# Patient Record
Sex: MALE | Race: WHITE | NOT HISPANIC OR LATINO
[De-identification: names, ages, dates, MRNs, and addresses within clinical notes are randomized per-mention and may not be internally consistent; named-entity substitution may affect disease eponyms.]

---

## 2020-06-03 ENCOUNTER — APPOINTMENT (OUTPATIENT)
Dept: OPHTHALMOLOGY | Facility: CLINIC | Age: 40
End: 2020-06-03
Payer: COMMERCIAL

## 2020-06-03 ENCOUNTER — NON-APPOINTMENT (OUTPATIENT)
Age: 40
End: 2020-06-03

## 2020-06-03 PROCEDURE — 92004 COMPRE OPH EXAM NEW PT 1/>: CPT

## 2020-06-04 ENCOUNTER — RESULT CHARGE (OUTPATIENT)
Age: 40
End: 2020-06-04

## 2020-06-05 ENCOUNTER — APPOINTMENT (OUTPATIENT)
Dept: INTERNAL MEDICINE | Facility: CLINIC | Age: 40
End: 2020-06-05
Payer: COMMERCIAL

## 2020-06-05 VITALS
OXYGEN SATURATION: 98 % | DIASTOLIC BLOOD PRESSURE: 78 MMHG | SYSTOLIC BLOOD PRESSURE: 108 MMHG | TEMPERATURE: 97.8 F | WEIGHT: 188 LBS | HEART RATE: 68 BPM

## 2020-06-05 DIAGNOSIS — Z83.3 FAMILY HISTORY OF DIABETES MELLITUS: ICD-10-CM

## 2020-06-05 DIAGNOSIS — J30.2 OTHER SEASONAL ALLERGIC RHINITIS: ICD-10-CM

## 2020-06-05 DIAGNOSIS — Z53.20 PROCEDURE AND TREATMENT NOT CARRIED OUT BECAUSE OF PATIENT'S DECISION FOR UNSPECIFIED REASONS: ICD-10-CM

## 2020-06-05 DIAGNOSIS — M54.2 CERVICALGIA: ICD-10-CM

## 2020-06-05 DIAGNOSIS — Z11.59 ENCOUNTER FOR SCREENING FOR OTHER VIRAL DISEASES: ICD-10-CM

## 2020-06-05 DIAGNOSIS — R51 HEADACHE: ICD-10-CM

## 2020-06-05 DIAGNOSIS — R73.09 OTHER ABNORMAL GLUCOSE: ICD-10-CM

## 2020-06-05 PROCEDURE — 99385 PREV VISIT NEW AGE 18-39: CPT | Mod: 25

## 2020-06-05 PROCEDURE — 36415 COLL VENOUS BLD VENIPUNCTURE: CPT

## 2020-06-05 PROCEDURE — 93000 ELECTROCARDIOGRAM COMPLETE: CPT

## 2020-06-05 RX ORDER — KETOCONAZOLE 20 MG/G
2 CREAM TOPICAL TWICE DAILY
Qty: 1 | Refills: 0 | Status: ACTIVE | COMMUNITY
Start: 2020-06-05 | End: 1900-01-01

## 2020-06-05 RX ORDER — NICOTINE POLACRILEX 2 MG/1
2 GUM, CHEWING ORAL
Qty: 1 | Refills: 2 | Status: ACTIVE | COMMUNITY
Start: 2020-06-05 | End: 1900-01-01

## 2020-06-05 NOTE — PLAN
[FreeTextEntry1] : EKG- NSR 74\par rash- trial of ketoconazole\par smoker- smoking cesation options discussed-\par f/u if symptoms worse or not improving\par

## 2020-06-05 NOTE — HEALTH RISK ASSESSMENT
[de-identified] : was smoking 17 yr.  now down to 3 cig/ day [Audit-CScore] : 3 [Reports changes in hearing] : Reports no changes in hearing [Reports changes in vision] : Reports no changes in vision [de-identified] : saw ophth yest [AdvancecareDate] : 06/20

## 2020-06-05 NOTE — HISTORY OF PRESENT ILLNESS
[de-identified] : vaccine- pt thinks tetanus was 8 yr ago but doesn't want it today\par no testicular pain\par rash- on back, arms- occasionally itchy.  not used any meds for it.  no allev or aggrav factors- started 8 mo ago\par \par occ mild neck pain assoc w mild pulsating sensation over occipital area.\par no vision chg, slurred speech or motor sensory deficitis\par occurs 1-2 x/ mo.  started 4 mo ago\par duration 30 min\par no allev or aggrav factors.  pt denies being stressed.\par  [FreeTextEntry1] : CPE

## 2020-06-08 ENCOUNTER — APPOINTMENT (OUTPATIENT)
Dept: INTERNAL MEDICINE | Facility: CLINIC | Age: 40
End: 2020-06-08
Payer: COMMERCIAL

## 2020-06-08 ENCOUNTER — TRANSCRIPTION ENCOUNTER (OUTPATIENT)
Age: 40
End: 2020-06-08

## 2020-06-08 DIAGNOSIS — R79.89 OTHER SPECIFIED ABNORMAL FINDINGS OF BLOOD CHEMISTRY: ICD-10-CM

## 2020-06-08 LAB
ALBUMIN SERPL ELPH-MCNC: 4.9 G/DL
ALP BLD-CCNC: 41 U/L
ALT SERPL-CCNC: 60 U/L
ANION GAP SERPL CALC-SCNC: 13 MMOL/L
AST SERPL-CCNC: 34 U/L
BASOPHILS # BLD AUTO: 0.06 K/UL
BASOPHILS NFR BLD AUTO: 1 %
BILIRUB SERPL-MCNC: 0.7 MG/DL
BUN SERPL-MCNC: 16 MG/DL
CALCIUM SERPL-MCNC: 9.8 MG/DL
CHLORIDE SERPL-SCNC: 99 MMOL/L
CHOLEST SERPL-MCNC: 192 MG/DL
CHOLEST/HDLC SERPL: 5.9 RATIO
CO2 SERPL-SCNC: 24 MMOL/L
CREAT SERPL-MCNC: 0.87 MG/DL
EOSINOPHIL # BLD AUTO: 0.07 K/UL
EOSINOPHIL NFR BLD AUTO: 1.2 %
ESTIMATED AVERAGE GLUCOSE: 192 MG/DL
GLUCOSE SERPL-MCNC: 202 MG/DL
HAV IGM SER QL: NONREACTIVE
HBA1C MFR BLD HPLC: 8.3 %
HBV CORE IGG+IGM SER QL: NONREACTIVE
HBV SURFACE AB SER QL: NONREACTIVE
HBV SURFACE AG SER QL: NONREACTIVE
HCT VFR BLD CALC: 44.3 %
HCV AB SER QL: NONREACTIVE
HCV S/CO RATIO: 0.1 S/CO
HDLC SERPL-MCNC: 33 MG/DL
HGB BLD-MCNC: 15 G/DL
IMM GRANULOCYTES NFR BLD AUTO: 0.3 %
LDLC SERPL CALC-MCNC: 110 MG/DL
LYMPHOCYTES # BLD AUTO: 2.23 K/UL
LYMPHOCYTES NFR BLD AUTO: 38.3 %
MAN DIFF?: NORMAL
MCHC RBC-ENTMCNC: 29.3 PG
MCHC RBC-ENTMCNC: 33.9 GM/DL
MCV RBC AUTO: 86.5 FL
MONOCYTES # BLD AUTO: 0.51 K/UL
MONOCYTES NFR BLD AUTO: 8.7 %
NEUTROPHILS # BLD AUTO: 2.94 K/UL
NEUTROPHILS NFR BLD AUTO: 50.5 %
PLATELET # BLD AUTO: 321 K/UL
POTASSIUM SERPL-SCNC: 4.5 MMOL/L
PROT SERPL-MCNC: 7.4 G/DL
RBC # BLD: 5.12 M/UL
RBC # FLD: 12.3 %
SAVE SPECIMEN: NORMAL
SODIUM SERPL-SCNC: 136 MMOL/L
T4 FREE SERPL-MCNC: 1.3 NG/DL
TRIGL SERPL-MCNC: 251 MG/DL
TSH SERPL-ACNC: 1.58 UIU/ML
WBC # FLD AUTO: 5.83 K/UL

## 2020-06-08 PROCEDURE — 99214 OFFICE O/P EST MOD 30 MIN: CPT | Mod: 95

## 2020-06-08 RX ORDER — ISOPROPYL ALCOHOL 70 ML/100ML
SWAB TOPICAL
Qty: 100 | Refills: 11 | Status: ACTIVE | COMMUNITY
Start: 2020-06-08 | End: 1900-01-01

## 2020-06-08 RX ORDER — LANCETS 28 GAUGE
EACH MISCELLANEOUS
Qty: 100 | Refills: 11 | Status: ACTIVE | COMMUNITY
Start: 2020-06-08 | End: 1900-01-01

## 2020-06-08 RX ORDER — BLOOD-GLUCOSE METER
W/DEVICE EACH MISCELLANEOUS
Qty: 1 | Refills: 0 | Status: ACTIVE | COMMUNITY
Start: 2020-06-08 | End: 1900-01-01

## 2020-06-08 RX ORDER — BLOOD-GLUCOSE CONTROL, NORMAL
EACH MISCELLANEOUS
Qty: 1 | Refills: 0 | Status: ACTIVE | COMMUNITY
Start: 2020-06-08 | End: 1900-01-01

## 2020-06-08 NOTE — ASSESSMENT
[FreeTextEntry1] : DM- start metformin.  SE discussed. -f/u \par lipids- try diet\par elev LFT-no alcohol-jose r in 1 mo

## 2020-06-08 NOTE — HISTORY OF PRESENT ILLNESS
[Home] : at home, [unfilled] , at the time of the visit. [Medical Office: ([facility])___] : at the medical office located in  [Verbal consent obtained from patient] : the patient, [unfilled] [FreeTextEntry1] : DM [de-identified] : DM- DM- new onset- reviewed DM- disease, complications, diet, treatment and its SE,  \par also reviewed hypoglycemia- prevention, treatment and symptoms\par reviewed glucose monitoring .  pre and post meal DM ranges.\par exercising- running, push ups\par \par lipid-elev -\par elev LFT- no supplements.  pt stopped alcohol\par \par hyperlipid

## 2020-06-08 NOTE — PLAN
[FreeTextEntry1] : 25  minutes minimal was spent with patient and >50% of the time spent in the encounter involved counseling and coordination of care for any and all diagnosis submitted.\par \par

## 2020-07-10 ENCOUNTER — TRANSCRIPTION ENCOUNTER (OUTPATIENT)
Age: 40
End: 2020-07-10

## 2020-07-20 ENCOUNTER — APPOINTMENT (OUTPATIENT)
Dept: ENDOCRINOLOGY | Facility: CLINIC | Age: 40
End: 2020-07-20
Payer: COMMERCIAL

## 2020-07-20 VITALS
OXYGEN SATURATION: 98 % | HEIGHT: 67 IN | DIASTOLIC BLOOD PRESSURE: 68 MMHG | SYSTOLIC BLOOD PRESSURE: 110 MMHG | HEART RATE: 70 BPM | TEMPERATURE: 98.4 F | BODY MASS INDEX: 28.56 KG/M2 | WEIGHT: 182 LBS

## 2020-07-20 PROCEDURE — 99204 OFFICE O/P NEW MOD 45 MIN: CPT

## 2020-07-20 RX ORDER — METFORMIN HYDROCHLORIDE 500 MG/1
500 TABLET, COATED ORAL
Qty: 1 | Refills: 2 | Status: DISCONTINUED | COMMUNITY
Start: 2020-06-08 | End: 2020-07-20

## 2020-07-20 RX ORDER — BLOOD SUGAR DIAGNOSTIC
STRIP MISCELLANEOUS TWICE DAILY
Qty: 2 | Refills: 3 | Status: ACTIVE | COMMUNITY
Start: 2020-06-08 | End: 1900-01-01

## 2020-07-21 NOTE — PHYSICAL EXAM
[Alert] : alert [Well Nourished] : well nourished [Healthy Appearance] : healthy appearance [No Acute Distress] : no acute distress [Normal Sclera/Conjunctiva] : normal sclera/conjunctiva [No Proptosis] : no proptosis [No Neck Mass] : no neck mass was observed [No Respiratory Distress] : no respiratory distress [Thyroid Not Enlarged] : the thyroid was not enlarged [Clear to Auscultation] : lungs were clear to auscultation bilaterally [Normal S1, S2] : normal S1 and S2 [Regular Rhythm] : with a regular rhythm [No Edema] : no peripheral edema [Pedal Pulses Normal] : the pedal pulses are present [Normal Bowel Sounds] : normal bowel sounds [Not Tender] : non-tender [Soft] : abdomen soft [No Spinal Tenderness] : no spinal tenderness [No Involuntary Movements] : no involuntary movements were seen [Normal Strength/Tone] : muscle strength and tone were normal [Right Foot Was Examined] : right foot ~C was examined [Left Foot Was Examined] : left foot ~C was examined [Normal Reflexes] : deep tendon reflexes were 2+ and symmetric [No Tremors] : no tremors [Normal Sensation on Monofilament Testing] : normal sensation on monofilament testing of lower extremities [Normal Affect] : the affect was normal [Normal Mood] : the mood was normal [Foot Ulcers] : no foot ulcers

## 2020-07-21 NOTE — HISTORY OF PRESENT ILLNESS
[FreeTextEntry1] : cc: diabetes\par \par 40 year old man with T2DM.  He recently got medical insurance, has family history of diabetes in his parents, saw PMD for initial visit about 6 weeks ago and was diagnosed with diabetes.  He was started on Metformin about a month ago,  Now taking 500 mg twice daily.  He checks glucose twice daily, values have been 120 - 160 mg/dl and 90 - 110 later in the day.  No hypoglycemia.  Had dilated eye exam, no retinopathy.  He has made dietary changes, has switched to whole grain bread (eats bread and rice every day, Khmer diet).  Does not eat sweets. Eats three meals per day, dinner is largest meal, also with large lunch on Saturday.   Has not seen dietician. Minimal exercise, is active during the day.  Has also gained weight since staying home due to covid.  Has had polydipsia, not polyuria, no numbness/tingling in fingers/toes.  Has had sporadic diarrhea with metformin.\par \par Blood pressure has been controlled\par \par Hyperlipidemia - not on statin, starting with diet

## 2020-07-21 NOTE — REVIEW OF SYSTEMS
[Visual Field Defect] : no visual field defect [Fatigue] : no fatigue [Decreased Appetite] : appetite not decreased [Chest Pain] : no chest pain [Dysphagia] : no dysphagia [Nausea] : no nausea [Shortness Of Breath] : no shortness of breath [Dysuria] : no dysuria [Headaches] : no headaches [Joint Pain] : no joint pain [Acanthosis] : no acanthosis  [Depression] : no depression [As Noted in HPI] : as noted in HPI [Easy Bleeding] : no ~M tendency for easy bleeding

## 2020-07-21 NOTE — ASSESSMENT
[FreeTextEntry1] : 40 year old man with T2DM, recently diagnosed, uncontrolled, with recent improvement based on reported blood glucose values\par  - Counseled pt on long term vascular complications of hyperglycemia as well as prevention and screening.   \par  - Referred to RD/CDE for nutritional counseling\par  - Will switch metformin to extended release and increase metformin to 2000 mg as tolerated\par  - Send glucose log in 2 weeks. \par  - Check urine microalbumin\par  - blood pressure at goal\par  - encouraged pt to continue with smoking cessation\par \par Overweight - Discussed importance of diet and exercise.  If glucose remains uncontrolled, consider adding GLP-1\par \par Hyperlipidemia - repeat lipids after trial of diet and exercise, consider statin\par \par f/u 3 months, send log in 2 weeks

## 2020-07-22 ENCOUNTER — TRANSCRIPTION ENCOUNTER (OUTPATIENT)
Age: 40
End: 2020-07-22

## 2020-07-23 LAB
CREAT SPEC-SCNC: 24 MG/DL
MICROALBUMIN 24H UR DL<=1MG/L-MCNC: <1.2 MG/DL
MICROALBUMIN/CREAT 24H UR-RTO: NORMAL MG/G

## 2020-07-31 ENCOUNTER — APPOINTMENT (OUTPATIENT)
Dept: DERMATOLOGY | Facility: CLINIC | Age: 40
End: 2020-07-31
Payer: COMMERCIAL

## 2020-07-31 VITALS — WEIGHT: 180 LBS | BODY MASS INDEX: 27.28 KG/M2 | HEIGHT: 68 IN

## 2020-07-31 VITALS — TEMPERATURE: 96 F

## 2020-07-31 DIAGNOSIS — L30.1 DYSHIDROSIS [POMPHOLYX]: ICD-10-CM

## 2020-07-31 DIAGNOSIS — B36.0 PITYRIASIS VERSICOLOR: ICD-10-CM

## 2020-07-31 DIAGNOSIS — B35.6 TINEA CRURIS: ICD-10-CM

## 2020-07-31 DIAGNOSIS — Z87.2 PERSONAL HISTORY OF DISEASES OF THE SKIN AND SUBCUTANEOUS TISSUE: ICD-10-CM

## 2020-07-31 DIAGNOSIS — Z84.0 FAMILY HISTORY OF DISEASES OF THE SKIN AND SUBCUTANEOUS TISSUE: ICD-10-CM

## 2020-07-31 DIAGNOSIS — Z12.83 ENCOUNTER FOR SCREENING FOR MALIGNANT NEOPLASM OF SKIN: ICD-10-CM

## 2020-07-31 PROCEDURE — 99203 OFFICE O/P NEW LOW 30 MIN: CPT | Mod: GC

## 2020-07-31 RX ORDER — TRIAMCINOLONE ACETONIDE 1 MG/G
0.1 OINTMENT TOPICAL
Qty: 1 | Refills: 1 | Status: ACTIVE | COMMUNITY
Start: 2020-07-31 | End: 1900-01-01

## 2020-07-31 RX ORDER — KETOCONAZOLE 20.5 MG/ML
2 SHAMPOO, SUSPENSION TOPICAL
Qty: 3 | Refills: 6 | Status: ACTIVE | COMMUNITY
Start: 2020-07-31 | End: 1900-01-01

## 2020-08-06 ENCOUNTER — APPOINTMENT (OUTPATIENT)
Dept: ENDOCRINOLOGY | Facility: CLINIC | Age: 40
End: 2020-08-06

## 2020-08-28 ENCOUNTER — APPOINTMENT (OUTPATIENT)
Dept: ENDOCRINOLOGY | Facility: CLINIC | Age: 40
End: 2020-08-28
Payer: COMMERCIAL

## 2020-08-28 VITALS — BODY MASS INDEX: 27.28 KG/M2 | HEIGHT: 68 IN | WEIGHT: 180 LBS

## 2020-08-28 PROCEDURE — G0108 DIAB MANAGE TRN  PER INDIV: CPT

## 2020-10-26 ENCOUNTER — APPOINTMENT (OUTPATIENT)
Dept: ENDOCRINOLOGY | Facility: CLINIC | Age: 40
End: 2020-10-26
Payer: COMMERCIAL

## 2020-10-26 VITALS
SYSTOLIC BLOOD PRESSURE: 118 MMHG | OXYGEN SATURATION: 98 % | TEMPERATURE: 98 F | HEART RATE: 78 BPM | DIASTOLIC BLOOD PRESSURE: 78 MMHG | HEIGHT: 68 IN | BODY MASS INDEX: 27.58 KG/M2 | WEIGHT: 182 LBS

## 2020-10-26 DIAGNOSIS — R61 GENERALIZED HYPERHIDROSIS: ICD-10-CM

## 2020-10-26 PROCEDURE — 99072 ADDL SUPL MATRL&STAF TM PHE: CPT | Mod: GC

## 2020-10-26 PROCEDURE — 83036 HEMOGLOBIN GLYCOSYLATED A1C: CPT | Mod: GC,QW

## 2020-10-26 PROCEDURE — 82962 GLUCOSE BLOOD TEST: CPT | Mod: GC

## 2020-10-26 PROCEDURE — 99214 OFFICE O/P EST MOD 30 MIN: CPT | Mod: 25,GC

## 2020-10-27 PROBLEM — R61 SWEATING INCREASE: Status: ACTIVE | Noted: 2020-10-27

## 2020-10-27 LAB
GLUCOSE BLDC GLUCOMTR-MCNC: 111
HBA1C MFR BLD HPLC: 6.1

## 2020-10-27 NOTE — REVIEW OF SYSTEMS
[As Noted in HPI] : as noted in HPI [Fatigue] : no fatigue [Decreased Appetite] : appetite not decreased [Visual Field Defect] : no visual field defect [Blurred Vision] : no blurred vision [Dysphagia] : no dysphagia [Chest Pain] : no chest pain [Palpitations] : no palpitations [Lower Ext Edema] : no lower extremity edema [Shortness Of Breath] : no shortness of breath [Nausea] : no nausea [Constipation] : no constipation [Vomiting] : no vomiting [Diarrhea] : no diarrhea [Dysuria] : no dysuria [Joint Pain] : no joint pain [Acanthosis] : no acanthosis  [Headaches] : no headaches [Depression] : no depression [Easy Bleeding] : no ~M tendency for easy bleeding [FreeTextEntry9] : Left heel pain

## 2020-10-27 NOTE — ASSESSMENT
[FreeTextEntry1] : 40 year old man with T2DM, diagnosed earlier this year.\par \par Controlled DM2\par - 10/26  POCT A1c 6.1% (improved from 6/2020 A1c 8.3%), microalbumin neg\par - Patient reports meeting with CDE and making dietary modifications including decreased carbs, sweets and decrease portion size\par - recommend continue Metformin 1000mg BID \par - recommend continue to check BG twice daily\par - Discussed possibly switching to GLP1 agonist, however patient opted to continue current regimen as he is motivated to wean himself off medication with lifestyle changes. \par - At next visit if A1c remains well controlled, will consider decreasing metformin \par  - blood pressure at goal\par  - declines flu shot\par  - retinopathy screening up to date, no retinpathy\par \par Overweight - Discussed importance of diet and exercise.  \par \par Hyperlipidemia - will repeat lipid profile today (fasting) as patient has made dietary changes\par \par Decreased Libido- will obtain Testosterone, LH and FSH levels. \par \par Increased sweating especially at night since last visit. Will recheck TSH with reflex t4 due to new onset of symptoms. \par \par Patient declined flu shot \par \par RTC in 3 months\par Seen and discussed with Dr Smith \par \par \par Raman Laughlin MD\par Endocrine Fellow \par

## 2020-10-27 NOTE — END OF VISIT
[] : Fellow [FreeTextEntry3] : Pt with T2DM, well controlled.   Conitnue metformin.   Repeat lipids.  CHeck Testosterone, lh, fsh, given new symptoms of decreased libido (explianed to pt that if low, will repeat in the morning).  Check TSH

## 2020-10-27 NOTE — HISTORY OF PRESENT ILLNESS
[FreeTextEntry1] : cc: diabetes\par \par 40 year old man with T2DM.  He recently got medical insurance, has family history of diabetes in his parents, Dx in 5/2020.  He was started on metformin. Reports adherence to Metformin 1000mg BID. Patient initially had some diarrhea and abdominal pain on treatment however symptoms have improved. Currently having loose stool, which is tolerable. \par \par He checks glucose twice daily, AM fasting 120-130 Before lunch or dinner 90- low 100s.  No hypoglycemia. \par Patient report meeting with CDE and since then has changed his diet. Decreased carbs ( specifically bread), decreased sweets and no soda, decreased portion size. 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 \par \par Had dilated eye exam in 6/2020, no retinopathy.  \par Does not report neuropathy. Does report some heel pain. \par Reports resolution of polyuria and polydipsia.\par \par  Now reporting decreased libido and as noted some sweating overnight. \par \par Blood pressure has been controlled\par \par Hyperlipidemia - not on statin, has modified diet. 6/2020 ( obtained before dietary changes)

## 2020-10-27 NOTE — PHYSICAL EXAM
[Alert] : alert [Well Nourished] : well nourished [Healthy Appearance] : healthy appearance [No Acute Distress] : no acute distress [Normal Sclera/Conjunctiva] : normal sclera/conjunctiva [No Neck Mass] : no neck mass was observed [Thyroid Not Enlarged] : the thyroid was not enlarged [No Respiratory Distress] : no respiratory distress [Clear to Auscultation] : lungs were clear to auscultation bilaterally [Normal S1, S2] : normal S1 and S2 [Regular Rhythm] : with a regular rhythm [No Edema] : no peripheral edema [Pedal Pulses Normal] : the pedal pulses are present [Normal Bowel Sounds] : normal bowel sounds [Not Tender] : non-tender [Soft] : abdomen soft [No Spinal Tenderness] : no spinal tenderness [No Involuntary Movements] : no involuntary movements were seen [Normal Strength/Tone] : muscle strength and tone were normal [Right Foot Was Examined] : right foot ~C was examined [Left Foot Was Examined] : left foot ~C was examined [Normal Reflexes] : deep tendon reflexes were 2+ and symmetric [No Tremors] : no tremors [Normal Sensation on Monofilament Testing] : normal sensation on monofilament testing of lower extremities [Normal Affect] : the affect was normal [Normal Mood] : the mood was normal [EOMI] : extra ocular movement intact [Oriented x3] : oriented to person, place, and time [Foot Ulcers] : no foot ulcers

## 2020-10-28 LAB
CHOLEST SERPL-MCNC: 135 MG/DL
FSH SERPL-MCNC: 7.7 IU/L
HDLC SERPL-MCNC: 35 MG/DL
LDLC SERPL CALC-MCNC: 70 MG/DL
LH SERPL-ACNC: 3.8 IU/L
NONHDLC SERPL-MCNC: 100 MG/DL
TRIGL SERPL-MCNC: 150 MG/DL
TSH SERPL-ACNC: 1.72 UIU/ML

## 2020-11-10 LAB
TESTOST BND SERPL-MCNC: 10.5 PG/ML
TESTOST SERPL-MCNC: 177.9 NG/DL

## 2021-02-08 ENCOUNTER — APPOINTMENT (OUTPATIENT)
Dept: ENDOCRINOLOGY | Facility: CLINIC | Age: 41
End: 2021-02-08
Payer: COMMERCIAL

## 2021-02-08 VITALS
OXYGEN SATURATION: 98 % | BODY MASS INDEX: 27.43 KG/M2 | HEART RATE: 72 BPM | HEIGHT: 68 IN | TEMPERATURE: 97.8 F | WEIGHT: 181 LBS | SYSTOLIC BLOOD PRESSURE: 120 MMHG | DIASTOLIC BLOOD PRESSURE: 80 MMHG

## 2021-02-08 DIAGNOSIS — E11.65 TYPE 2 DIABETES MELLITUS WITH HYPERGLYCEMIA: ICD-10-CM

## 2021-02-08 DIAGNOSIS — R68.82 DECREASED LIBIDO: ICD-10-CM

## 2021-02-08 LAB
GLUCOSE BLDC GLUCOMTR-MCNC: 101
HBA1C MFR BLD HPLC: 5.8

## 2021-02-08 PROCEDURE — 99214 OFFICE O/P EST MOD 30 MIN: CPT | Mod: 25

## 2021-02-08 PROCEDURE — 82962 GLUCOSE BLOOD TEST: CPT

## 2021-02-08 PROCEDURE — 83036 HEMOGLOBIN GLYCOSYLATED A1C: CPT | Mod: QW

## 2021-02-08 PROCEDURE — 99072 ADDL SUPL MATRL&STAF TM PHE: CPT

## 2021-02-08 NOTE — HISTORY OF PRESENT ILLNESS
[FreeTextEntry1] : cc: diabetes\par \par 40 year old man with T2DM. well controlled, on metformin 500 in am and 1000 in pm. Has occasional bloating/gas.  He checks glucose occasionally and values have been 85 - 130 mg/dl.    No hypoglycemia. \par He has been limiting carbohydrate intake.  Minimal exercise (walks a little).  Thinks he has gained some weight.\par Had dilated eye exam in 6/2020, no retinopathy.  \par Does not want flu shot\par \par Still with decreased libido, improvement in overnight sweating (thinks it is related to eating chicken/eggs).  Has not yet had morning testosterone checked.  No galactorrhea, breast tenderness\par \par Blood pressure has been controlled\par \par Hyperlipidemia - not on statin, LDL decreased with dietary change

## 2021-02-08 NOTE — ASSESSMENT
[FreeTextEntry1] : 40 year old man with T2DM, well controlled\par - continue Metformin 1000mg BID \par  - blood pressure at goal\par  - declines flu shot\par  - retinopathy screening up to date, no retinopathy\par \par Overweight - Encouraged pt to continue with diet and increase exercise.  \par \par Hyperlipidemia - LDL 70\par \par Decreased Libido- Check AM testosterone, with lh, fsh, prolactin\par \par \par f/u 3 months\par

## 2021-04-13 ENCOUNTER — TRANSCRIPTION ENCOUNTER (OUTPATIENT)
Age: 41
End: 2021-04-13

## 2021-05-10 ENCOUNTER — APPOINTMENT (OUTPATIENT)
Dept: ENDOCRINOLOGY | Facility: CLINIC | Age: 41
End: 2021-05-10

## 2021-06-17 ENCOUNTER — RX RENEWAL (OUTPATIENT)
Age: 41
End: 2021-06-17

## 2021-06-25 ENCOUNTER — APPOINTMENT (OUTPATIENT)
Dept: INTERNAL MEDICINE | Facility: CLINIC | Age: 41
End: 2021-06-25
Payer: COMMERCIAL

## 2021-06-25 ENCOUNTER — LABORATORY RESULT (OUTPATIENT)
Age: 41
End: 2021-06-25

## 2021-06-25 DIAGNOSIS — Z20.828 CONTACT WITH AND (SUSPECTED) EXPOSURE TO OTHER VIRAL COMMUNICABLE DISEASES: ICD-10-CM

## 2021-06-25 PROCEDURE — 99072 ADDL SUPL MATRL&STAF TM PHE: CPT

## 2021-06-25 PROCEDURE — 36415 COLL VENOUS BLD VENIPUNCTURE: CPT

## 2021-06-27 LAB
ALBUMIN SERPL ELPH-MCNC: 4.9 G/DL
ALP BLD-CCNC: 41 U/L
ALT SERPL-CCNC: 49 U/L
ANION GAP SERPL CALC-SCNC: 18 MMOL/L
APPEARANCE: CLEAR
AST SERPL-CCNC: 23 U/L
BASOPHILS # BLD AUTO: 0.03 K/UL
BASOPHILS NFR BLD AUTO: 0.5 %
BILIRUB SERPL-MCNC: 0.3 MG/DL
BILIRUBIN URINE: NEGATIVE
BLOOD URINE: NEGATIVE
BUN SERPL-MCNC: 13 MG/DL
CALCIUM SERPL-MCNC: 9.7 MG/DL
CHLORIDE SERPL-SCNC: 102 MMOL/L
CHOLEST SERPL-MCNC: 152 MG/DL
CO2 SERPL-SCNC: 21 MMOL/L
COLOR: NORMAL
COVID-19 SPIKE DOMAIN ANTIBODY INTERPRETATION: POSITIVE
CREAT SERPL-MCNC: 0.98 MG/DL
EOSINOPHIL # BLD AUTO: 0.07 K/UL
EOSINOPHIL NFR BLD AUTO: 1.1 %
ERYTHROCYTE [SEDIMENTATION RATE] IN BLOOD BY WESTERGREN METHOD: < 2 MM/HR
ESTIMATED AVERAGE GLUCOSE: 137 MG/DL
GLUCOSE QUALITATIVE U: NEGATIVE
GLUCOSE SERPL-MCNC: 126 MG/DL
HBA1C MFR BLD HPLC: 6.4 %
HCT VFR BLD CALC: 44.8 %
HDLC SERPL-MCNC: 34 MG/DL
HGB BLD-MCNC: 14.9 G/DL
IMM GRANULOCYTES NFR BLD AUTO: 0.3 %
KETONES URINE: NEGATIVE
LDLC SERPL CALC-MCNC: 76 MG/DL
LDLC SERPL DIRECT ASSAY-MCNC: 86 MG/DL
LEUKOCYTE ESTERASE URINE: NEGATIVE
LYMPHOCYTES # BLD AUTO: 2.3 K/UL
LYMPHOCYTES NFR BLD AUTO: 36.9 %
MAN DIFF?: NORMAL
MCHC RBC-ENTMCNC: 28.7 PG
MCHC RBC-ENTMCNC: 33.3 GM/DL
MCV RBC AUTO: 86.2 FL
MONOCYTES # BLD AUTO: 0.49 K/UL
MONOCYTES NFR BLD AUTO: 7.9 %
NEUTROPHILS # BLD AUTO: 3.32 K/UL
NEUTROPHILS NFR BLD AUTO: 53.3 %
NITRITE URINE: NEGATIVE
NONHDLC SERPL-MCNC: 118 MG/DL
PH URINE: 6.5
PLATELET # BLD AUTO: 293 K/UL
POTASSIUM SERPL-SCNC: 4.6 MMOL/L
PROT SERPL-MCNC: 7 G/DL
PROTEIN URINE: NEGATIVE
PSA SERPL-MCNC: 0.37 NG/ML
RBC # BLD: 5.2 M/UL
RBC # FLD: 12.4 %
SARS-COV-2 AB SERPL IA-ACNC: >250 U/ML
SAVE SPECIMEN: NORMAL
SODIUM SERPL-SCNC: 141 MMOL/L
SPECIFIC GRAVITY URINE: 1.01
T3 SERPL-MCNC: 118 NG/DL
T3RU NFR SERPL: 1 TBI
T4 FREE SERPL-MCNC: 1.4 NG/DL
TRIGL SERPL-MCNC: 211 MG/DL
TSH SERPL-ACNC: 1.6 UIU/ML
UROBILINOGEN URINE: NORMAL
WBC # FLD AUTO: 6.23 K/UL

## 2021-07-02 ENCOUNTER — APPOINTMENT (OUTPATIENT)
Dept: INTERNAL MEDICINE | Facility: CLINIC | Age: 41
End: 2021-07-02
Payer: COMMERCIAL

## 2021-07-02 ENCOUNTER — NON-APPOINTMENT (OUTPATIENT)
Age: 41
End: 2021-07-02

## 2021-07-02 VITALS
BODY MASS INDEX: 27.89 KG/M2 | HEART RATE: 93 BPM | HEIGHT: 68 IN | TEMPERATURE: 97.4 F | DIASTOLIC BLOOD PRESSURE: 80 MMHG | WEIGHT: 184 LBS | OXYGEN SATURATION: 98 % | SYSTOLIC BLOOD PRESSURE: 130 MMHG

## 2021-07-02 DIAGNOSIS — E78.5 HYPERLIPIDEMIA, UNSPECIFIED: ICD-10-CM

## 2021-07-02 DIAGNOSIS — F52.4 PREMATURE EJACULATION: ICD-10-CM

## 2021-07-02 DIAGNOSIS — R06.00 DYSPNEA, UNSPECIFIED: ICD-10-CM

## 2021-07-02 DIAGNOSIS — E66.3 OVERWEIGHT: ICD-10-CM

## 2021-07-02 PROCEDURE — 93000 ELECTROCARDIOGRAM COMPLETE: CPT

## 2021-07-02 PROCEDURE — 99072 ADDL SUPL MATRL&STAF TM PHE: CPT

## 2021-07-02 PROCEDURE — 99386 PREV VISIT NEW AGE 40-64: CPT | Mod: 25

## 2021-07-02 PROCEDURE — 99396 PREV VISIT EST AGE 40-64: CPT | Mod: 25

## 2021-07-02 PROCEDURE — 99406 BEHAV CHNG SMOKING 3-10 MIN: CPT | Mod: NC

## 2021-07-02 PROCEDURE — 71046 X-RAY EXAM CHEST 2 VIEWS: CPT

## 2021-07-02 NOTE — HEALTH RISK ASSESSMENT
[Excellent] : ~his/her~  mood as  excellent [] : Yes [20 or more] : 20 or more [Yes] : Yes [Monthly or less (1 pt)] : Monthly or less (1 point) [Less than monthly (1 pt)] : Less than monthly (1 point) [No] : In the past 12 months have you used drugs other than those required for medical reasons? No [HIV test declined] : HIV test declined [Hepatitis C test declined] : Hepatitis C test declined [Behavioral] : behavioral [With Family] : lives with family [Employed] : employed [] :  [Sexually Active] : sexually active [Feels Safe at Home] : Feels safe at home [Fully functional (bathing, dressing, toileting, transferring, walking, feeding)] : Fully functional (bathing, dressing, toileting, transferring, walking, feeding) [Fully functional (using the telephone, shopping, preparing meals, housekeeping, doing laundry, using] : Fully functional and needs no help or supervision to perform IADLs (using the telephone, shopping, preparing meals, housekeeping, doing laundry, using transportation, managing medications and managing finances) [Reports normal functional visual acuity (ie: able to read med bottle)] : Reports normal functional visual acuity [Smoke Detector] : smoke detector [Carbon Monoxide Detector] : carbon monoxide detector [Safety elements used in home] : safety elements used in home [Seat Belt] :  uses seat belt [Sunscreen] : uses sunscreen [FreeTextEntry1] : Gen. health [de-identified] : Endocrinology, dermatology [YearQuit] : Current smoker [de-identified] : Very little exercise, more in the past [Change in mental status noted] : No change in mental status noted [Language] : denies difficulty with language [Behavior] : denies difficulty with behavior [Learning/Retaining New Information] : denies difficulty learning/retaining new information [Handling Complex Tasks] : denies difficulty handling complex tasks [Reasoning] : denies difficulty with reasoning [Spatial Ability and Orientation] : denies difficulty with spatial ability and orientation [High Risk Behavior] : no high risk behavior [Reports changes in hearing] : Reports no changes in hearing [Reports changes in vision] : Reports no changes in vision [Reports changes in dental health] : Reports no changes in dental health [Guns at Home] : no guns at home [Travel to Developing Areas] : does not  travel to developing areas [TB Exposure] : is not being exposed to tuberculosis [Caregiver Concerns] : does not have caregiver concerns [de-identified] : Until the past 2 years he has not had any preventative care. 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

## 2021-07-02 NOTE — COUNSELING
[Risk of tobacco use and health benefits of smoking cessation discussed] : Risk of tobacco use and health benefits of smoking cessation discussed [Cessation strategies including cessation program discussed] : Cessation strategies including cessation program discussed [Use of nicotine replacement therapies and other medications discussed] : Use of nicotine replacement therapies and other medications discussed [Tobacco Use Cessation Intermediate Greater Than 3 Minutes Up to 10 Minutes] : Tobacco Use Cessation Intermediate Greater Than 3 Minutes Up to 10 Minutes [Potential consequences of obesity discussed] : Potential consequences of obesity discussed [Benefits of weight loss discussed] : Benefits of weight loss discussed [Target Wt Loss Goal ___] : Weight Loss Goals: Target weight loss goal [unfilled] lbs [Needs reinforcement, provided] : Patient needs reinforcement on understanding of lifestyle changes and steps needed to achieve self management goal; reinforcement was provided [Willing to Quit Smoking] : Not willing to quit smoking [FreeTextEntry2] : The patient is not receptive to discontinuation of cigarette smoking. [FreeTextEntry1] : 10

## 2021-07-02 NOTE — HISTORY OF PRESENT ILLNESS
[FreeTextEntry1] : New patient, comprehensive annual examination [de-identified] : Feels well. Diabetes under good control, followed by endocrine. His only present complaint is that early ejaculation, not premature.

## 2021-07-02 NOTE — REVIEW OF SYSTEMS
[Dyspnea on Exertion] : dyspnea on exertion [Negative] : Neurological [Shortness Of Breath] : no shortness of breath [Wheezing] : no wheezing [Cough] : no cough [FreeTextEntry6] : Attributed by the patient to a sedentary lifestyle, no exercise [FreeTextEntry8] : See present illness

## 2021-07-02 NOTE — DATA REVIEWED
[FreeTextEntry1] : Electrocardiogram reveals a sinus rhythm and is within normal limits.\par Chest x-ray reveals clear lung fields, normal heart size and a normal bony thorax for age-no active disease.\par \par Blood work and urinalysis are reviewed and are satisfactory as is his A1c.

## 2021-07-02 NOTE — ASSESSMENT
[FreeTextEntry1] : diabetes is well-controlled. Cigarette smoking is a major risk factor He is slightly overweight and presently not getting exercise.. Diet control may be a bigger problem going ahead in view of his unwillingness to moderatebread and rice.\par Sertraline prior to intercourse on a one dose basis will be tried to see if it doesn't slow ejaculation.\par cPE in one year.,  With PFTs.\par \par He is strongly urged to receive the covid ! vaccination.

## 2021-07-07 ENCOUNTER — TRANSCRIPTION ENCOUNTER (OUTPATIENT)
Age: 41
End: 2021-07-07

## 2021-08-11 ENCOUNTER — TRANSCRIPTION ENCOUNTER (OUTPATIENT)
Age: 41
End: 2021-08-11

## 2021-08-20 ENCOUNTER — TRANSCRIPTION ENCOUNTER (OUTPATIENT)
Age: 41
End: 2021-08-20

## 2021-12-21 ENCOUNTER — RX RENEWAL (OUTPATIENT)
Age: 41
End: 2021-12-21

## 2021-12-21 RX ORDER — METFORMIN ER 500 MG 500 MG/1
500 TABLET ORAL
Qty: 360 | Refills: 1 | Status: ACTIVE | COMMUNITY
Start: 2020-07-20 | End: 1900-01-01

## 2022-01-31 ENCOUNTER — TRANSCRIPTION ENCOUNTER (OUTPATIENT)
Age: 42
End: 2022-01-31

## 2022-02-01 ENCOUNTER — APPOINTMENT (OUTPATIENT)
Dept: ORTHOPEDIC SURGERY | Facility: CLINIC | Age: 42
End: 2022-02-01
Payer: COMMERCIAL

## 2022-02-01 VITALS
DIASTOLIC BLOOD PRESSURE: 93 MMHG | HEART RATE: 86 BPM | WEIGHT: 180 LBS | BODY MASS INDEX: 27.28 KG/M2 | HEIGHT: 68 IN | SYSTOLIC BLOOD PRESSURE: 149 MMHG

## 2022-02-01 DIAGNOSIS — M25.531 PAIN IN RIGHT WRIST: ICD-10-CM

## 2022-02-01 DIAGNOSIS — S69.91XA UNSPECIFIED INJURY OF RIGHT WRIST, HAND AND FINGER(S), INITIAL ENCOUNTER: ICD-10-CM

## 2022-02-01 DIAGNOSIS — M25.511 PAIN IN RIGHT SHOULDER: ICD-10-CM

## 2022-02-01 PROCEDURE — 99203 OFFICE O/P NEW LOW 30 MIN: CPT

## 2022-02-01 PROCEDURE — 73110 X-RAY EXAM OF WRIST: CPT | Mod: RT

## 2022-02-01 PROCEDURE — 73030 X-RAY EXAM OF SHOULDER: CPT | Mod: RT

## 2022-02-01 NOTE — DISCUSSION/SUMMARY
[de-identified] : The patient had an injury to his right shoulder last week.  This seems to be improving with time.  He has only minimal discomfort today.  In terms of his right wrist he had an unknown fracture 6 weeks ago.  I find no evidence of fracture on today's x-rays.  He has minor discomfort.  I have discussed the pathology and natural history with him.  I recommend he use his arms to tolerance.  He will take Advil for pain.  If still symptomatic in 3 weeks he will be reevaluated.

## 2022-02-01 NOTE — HISTORY OF PRESENT ILLNESS
[de-identified] : 41 year old RHD male  presents with right shoulder pain x 1 week. He describes an insidious onset of pain.  Initially he was unable to move his arm. He is taking Advil and Tylenol and applies ice and Bengay. He reports that the pain is diminished. He complains of discomfort over the anterior aspect of the shoulder. He hears a clicking sound when he moves his arm. \par He also requests evaluation of his right wrist. He fractured his wrist approximately 6 months ago after a fall. He was seen at urgent care where he was treated in an immobilizer. He continues to have pain on the dorsum of the wrist intermittently.

## 2022-02-01 NOTE — PHYSICAL EXAM
[Rad] : radial 2+ and symmetric bilaterally [Normal] : Alert and in no acute distress [Poor Appearance] : well-appearing [Acute Distress] : not in acute distress [Obese] : not obese [de-identified] : The patient has no respiratory distress. Mood and affect are normal. The patient is alert and oriented to person, place and time.\par Examination of the cervical spine demonstrates no tenderness, no deformity and no muscle spasm. Cervical spine rotation is 60° to the right, 60° to the left, 75° of extension and 45° of flexion. Neurologic exam of the upper extremities reveals intact sensation to light touch. Motor function is 5 over 5 in all groups. Deep tendon reflexes are 2+ and equal at the biceps, triceps and brachioradialis.\par Examination of the shoulders demonstrates no swelling or deformity.  There is minimal anterior tenderness of the right shoulder.  The shoulder is stable.  Drop arm test is negative.  Impingement is negative.  Belly press test is negative.  [place] test is negative.  He exhibits elevation of 160 degrees bilaterally, external rotation of 50 degrees bilaterally and internal rotation to the upper lumbar level bilaterally.  The elbows are stable and nontender.  There is no pain with elbow motion.  There is no pain with wrist motion.  There is minimal tenderness on the dorsum of the right wrist.  Tendon function is intact.  The skin is intact.  There is no lymphedema. [de-identified] : AP, transscapular and axillary x-rays of the right shoulder taken today demonstrate no fracture, no dislocation and no bony abnormality.  AP, lateral and oblique x-rays of the right wrist taken today demonstrate no fracture, no dislocation and no bony abnormality.\par

## 2022-03-28 ENCOUNTER — TRANSCRIPTION ENCOUNTER (OUTPATIENT)
Age: 42
End: 2022-03-28

## 2022-06-17 ENCOUNTER — APPOINTMENT (OUTPATIENT)
Dept: INTERNAL MEDICINE | Facility: CLINIC | Age: 42
End: 2022-06-17

## 2022-06-21 ENCOUNTER — RX RENEWAL (OUTPATIENT)
Age: 42
End: 2022-06-21

## 2022-06-24 ENCOUNTER — APPOINTMENT (OUTPATIENT)
Dept: INTERNAL MEDICINE | Facility: CLINIC | Age: 42
End: 2022-06-24

## 2022-09-08 ENCOUNTER — APPOINTMENT (OUTPATIENT)
Dept: OTOLARYNGOLOGY | Facility: CLINIC | Age: 42
End: 2022-09-08

## 2022-09-08 VITALS
BODY MASS INDEX: 22.73 KG/M2 | DIASTOLIC BLOOD PRESSURE: 78 MMHG | HEIGHT: 68 IN | HEART RATE: 101 BPM | SYSTOLIC BLOOD PRESSURE: 118 MMHG | WEIGHT: 150 LBS

## 2022-09-08 DIAGNOSIS — H90.5 UNSPECIFIED SENSORINEURAL HEARING LOSS: ICD-10-CM

## 2022-09-08 PROCEDURE — 92557 COMPREHENSIVE HEARING TEST: CPT

## 2022-09-08 PROCEDURE — 92567 TYMPANOMETRY: CPT

## 2022-09-08 PROCEDURE — 99203 OFFICE O/P NEW LOW 30 MIN: CPT | Mod: 25

## 2022-09-08 PROCEDURE — 92504 EAR MICROSCOPY EXAMINATION: CPT

## 2022-09-08 RX ORDER — SERTRALINE 25 MG/1
25 TABLET, FILM COATED ORAL
Qty: 30 | Refills: 11 | Status: COMPLETED | COMMUNITY
Start: 2021-07-02 | End: 2022-09-08

## 2022-09-09 NOTE — HISTORY OF PRESENT ILLNESS
[de-identified] : 42 year old male presents for evaluation for bilateral pressure behind ear and head "pulsing" for about 6 months.\par Several times a week. last for several minutes goes away by itself\par States unsure if any changes in hearing, but occasionally speak very loud, lives in a busy area in [place] for over 20 years with lots of loud noise exposure. \par History of tension headaches\par Denies head/otologic trauma. Denies otalgia, ear infections.\par  \par

## 2022-09-09 NOTE — DATA REVIEWED
[de-identified] : An audiogram was ordered and performed including pure tones, tympanometry and speech testing for the patients complaint of hearing loss\par I have independently reviewed the patient's audiogram from today and my findings include AU Hearing -8kz. DAVE Tymp A

## 2022-09-09 NOTE — ASSESSMENT
[FreeTextEntry1] : 42Y M present for perceived change in hearing. Audiogram shows  AU Hearing -8kz. AU Tymp A. Symptoms stated by patient clinically sound similar to migraines/headaches as the pain starts from the back of the ear and travels upward to the top of the head. Physical exam of ear is normal\par \par Recommend:\par Head Tension\par -Follow up with neurology for headaches and PCP for possible work up of muscular skeletal tension that maybe contributing to patients symptoms\par \par -Return to clinic as needed or sooner if new/worsen symptoms present\par

## 2022-09-09 NOTE — PHYSICAL EXAM
[Binocular Microscopic Exam] : Binocular microscopic exam was performed [Hearing Loss Right Only] : normal [Hearing Loss Left Only] : normal [Normal] : mucosa is normal [Midline] : trachea located in midline position

## 2023-02-06 ENCOUNTER — NON-APPOINTMENT (OUTPATIENT)
Age: 43
End: 2023-02-06

## 2023-02-14 ENCOUNTER — NON-APPOINTMENT (OUTPATIENT)
Age: 43
End: 2023-02-14

## 2023-02-14 ENCOUNTER — APPOINTMENT (OUTPATIENT)
Dept: INTERNAL MEDICINE | Facility: CLINIC | Age: 43
End: 2023-02-14
Payer: COMMERCIAL

## 2023-02-14 VITALS
HEIGHT: 68 IN | BODY MASS INDEX: 22.43 KG/M2 | OXYGEN SATURATION: 98 % | DIASTOLIC BLOOD PRESSURE: 70 MMHG | HEART RATE: 92 BPM | WEIGHT: 148 LBS | TEMPERATURE: 96.5 F | SYSTOLIC BLOOD PRESSURE: 110 MMHG

## 2023-02-14 DIAGNOSIS — F17.210 NICOTINE DEPENDENCE, CIGARETTES, UNCOMPLICATED: ICD-10-CM

## 2023-02-14 DIAGNOSIS — Z11.3 ENCOUNTER FOR SCREENING FOR INFECTIONS WITH A PREDOMINANTLY SEXUAL MODE OF TRANSMISSION: ICD-10-CM

## 2023-02-14 DIAGNOSIS — R51.9 HEADACHE, UNSPECIFIED: ICD-10-CM

## 2023-02-14 DIAGNOSIS — F32.A DEPRESSION, UNSPECIFIED: ICD-10-CM

## 2023-02-14 DIAGNOSIS — Z00.00 ENCOUNTER FOR GENERAL ADULT MEDICAL EXAMINATION W/OUT ABNORMAL FINDINGS: ICD-10-CM

## 2023-02-14 DIAGNOSIS — E11.9 TYPE 2 DIABETES MELLITUS W/OUT COMPLICATIONS: ICD-10-CM

## 2023-02-14 DIAGNOSIS — R10.9 UNSPECIFIED ABDOMINAL PAIN: ICD-10-CM

## 2023-02-14 DIAGNOSIS — Z71.6 TOBACCO ABUSE COUNSELING: ICD-10-CM

## 2023-02-14 DIAGNOSIS — R61 GENERALIZED HYPERHIDROSIS: ICD-10-CM

## 2023-02-14 DIAGNOSIS — F17.200 NICOTINE DEPENDENCE, UNSPECIFIED, UNCOMPLICATED: ICD-10-CM

## 2023-02-14 PROCEDURE — 99407 BEHAV CHNG SMOKING > 10 MIN: CPT

## 2023-02-14 PROCEDURE — 93000 ELECTROCARDIOGRAM COMPLETE: CPT | Mod: 59

## 2023-02-14 PROCEDURE — 36415 COLL VENOUS BLD VENIPUNCTURE: CPT

## 2023-02-14 PROCEDURE — G0444 DEPRESSION SCREEN ANNUAL: CPT | Mod: 59

## 2023-02-14 PROCEDURE — 99396 PREV VISIT EST AGE 40-64: CPT | Mod: 25

## 2023-02-14 NOTE — HISTORY OF PRESENT ILLNESS
[FreeTextEntry1] : HERE FOR Comprehensive annual examination  [de-identified] : sadness and chest heaviness during winter\par LOST 30 LBS OVER 12 MONTHS WITH DIET AND EXERCISE

## 2023-02-14 NOTE — COUNSELING
[Yes] : Risk of tobacco use and health benefits of smoking cessation discussed: Yes [Cessation strategies including cessation program discussed] : Cessation strategies including cessation program discussed [Use of nicotine replacement therapies and other medications discussed] : Use of nicotine replacement therapies and other medications discussed [Encouraged to pick a quit date and identify support needed to quit] : Encouraged to pick a quit date and identify support needed to quit [Smoking Cessation Program Referral] : Smoking Cessation Program Referral  [No] : Not willing to quit smoking [FreeTextEntry3] : 15

## 2023-02-14 NOTE — HEALTH RISK ASSESSMENT
[2 - 3 times a week (3 pts)] : 2 - 3  times a week (3 points) [1 or 2 (0 pts)] : 1 or 2 (0 points) [Never (0 pts)] : Never (0 points) [Yes] : In the past 12 months have you used drugs other than those required for medical reasons? Yes [0] : 2) Feeling down, depressed, or hopeless: Not at all (0) [Hepatitis C test offered] : Hepatitis C test offered [Fair] :  ~his/her~ mood as fair [No falls in past year] : Patient reported no falls in the past year [1] : 1) Little interest or pleasure doing things for several days (1) [I have developed a follow-up plan documented below in the note.] : I have developed a follow-up plan documented below in the note. [HIV Test offered] : HIV Test offered [None] : None [With Family] : lives with family [Employed] : employed [] :  [# Of Children ___] : has [unfilled] children [Audit-CScore] : 3 [[id number]] : 1 [Reports changes in hearing] : Reports no changes in hearing [Reports changes in vision] : Reports no changes in vision [de-identified] : saw ophth yest

## 2023-02-14 NOTE — ASSESSMENT
[FreeTextEntry1] : sadness and chest heaviness during winter\par -PSCY REF\par -CARDIOLOGY REF\par smoker- smoking cesation options discussed-\par PT STATED HE STOP ALL OTHER Reactional (WEED)  DRUGS \par *Immunizations \par COVID -19 TOTAL 0 VACCINE \par Influenza   declines\par Pneumococcal - N/A \par TDAP - DECLINE\par GARDASIL :   DECLINE\par shingles vaccine N/A\par EKG done for HCM at this office today;  NSR no acute ST-T changes\par Lab ( venipuncture ) done today at this office\par Preventive medicine discussed - including importance of lifestyle modification - \par with incorporation of healthy diet,  recommended Diet low fat diet\par + regular exercise\par con't calcium 1200-1500mg and vit D 3308-4702 IU daily and regular weight bearing exercise\par for OP prevention\par weight loss counseling provided as above \par Depression screening WITH the Patient Health Questionnaire-2 (PHQ-2), THE RESULT WAS NEGATIVE.\par The benefits of adequate calcium intake and routine daily cardiovascular exercise were reviewed with the patient.\par  The patient was informed regarding the benefits of a YEARLY screening FOR SKIN CANCER \par -Recommended following up with dermatology for annual skin surveillance.\par Recommended following up with dental, opthalmology\par  The importance of safer-sex was discussed with the patient. \par DISCUSSED PRECAUTIONS AGAINST COVID19, INCLUDING MASK WEARING, SOCIAL DISTANCING AND HAND WASHING.\par -Follow up in 1 year for CPE / annual exam or PRN.\par All questions and concerns were discussed.

## 2023-02-14 NOTE — PHYSICAL EXAM
[Well Nourished] : well nourished [Well Developed] : well developed [Well-Appearing] : well-appearing [Normal Sclera/Conjunctiva] : normal sclera/conjunctiva [PERRL] : pupils equal round and reactive to light [Normal Outer Ear/Nose] : the outer ears and nose were normal in appearance [Normal Oropharynx] : the oropharynx was normal [Normal TMs] : both tympanic membranes were normal [No Lymphadenopathy] : no lymphadenopathy [Supple] : supple [Thyroid Normal, No Nodules] : the thyroid was normal and there were no nodules present [No Respiratory Distress] : no respiratory distress  [No Accessory Muscle Use] : no accessory muscle use [Clear to Auscultation] : lungs were clear to auscultation bilaterally [Normal Rate] : normal rate  [Regular Rhythm] : with a regular rhythm [Normal S1, S2] : normal S1 and S2 [No Murmur] : no murmur heard [No Edema] : there was no peripheral edema [Soft] : abdomen soft [Non Tender] : non-tender [Non-distended] : non-distended [No Masses] : no abdominal mass palpated [Normal Bowel Sounds] : normal bowel sounds [Grossly Normal Strength/Tone] : grossly normal strength/tone [No Focal Deficits] : no focal deficits [Normal Gait] : normal gait [Normal Affect] : the affect was normal [Normal Insight/Judgement] : insight and judgment were intact

## 2023-02-15 ENCOUNTER — NON-APPOINTMENT (OUTPATIENT)
Age: 43
End: 2023-02-15

## 2023-02-15 LAB
25(OH)D3 SERPL-MCNC: 16.2 NG/ML
ALBUMIN SERPL ELPH-MCNC: 4.9 G/DL
ALP BLD-CCNC: 38 U/L
ALT SERPL-CCNC: 13 U/L
ANION GAP SERPL CALC-SCNC: 13 MMOL/L
APPEARANCE: CLEAR
AST SERPL-CCNC: 15 U/L
BASOPHILS # BLD AUTO: 0.05 K/UL
BASOPHILS NFR BLD AUTO: 0.4 %
BILIRUB SERPL-MCNC: 0.5 MG/DL
BILIRUBIN URINE: NEGATIVE
BLOOD URINE: NEGATIVE
BUN SERPL-MCNC: 12 MG/DL
CALCIUM SERPL-MCNC: 9.8 MG/DL
CHLORIDE SERPL-SCNC: 101 MMOL/L
CHOLEST SERPL-MCNC: 127 MG/DL
CO2 SERPL-SCNC: 26 MMOL/L
COLOR: NORMAL
CREAT SERPL-MCNC: 0.8 MG/DL
CREAT SPEC-SCNC: 78 MG/DL
EGFR: 114 ML/MIN/1.73M2
EOSINOPHIL # BLD AUTO: 0.02 K/UL
EOSINOPHIL NFR BLD AUTO: 0.2 %
ESTIMATED AVERAGE GLUCOSE: 123 MG/DL
GLUCOSE QUALITATIVE U: NEGATIVE
GLUCOSE SERPL-MCNC: 93 MG/DL
HBA1C MFR BLD HPLC: 5.9 %
HBV SURFACE AB SER QL: NONREACTIVE
HBV SURFACE AG SER QL: NONREACTIVE
HCT VFR BLD CALC: 45.8 %
HCV AB SER QL: NONREACTIVE
HCV S/CO RATIO: 0.06 S/CO
HDLC SERPL-MCNC: 37 MG/DL
HGB BLD-MCNC: 15.3 G/DL
HIV1+2 AB SPEC QL IA.RAPID: NONREACTIVE
HSV 1+2 IGG SER IA-IMP: NEGATIVE
HSV 1+2 IGG SER IA-IMP: POSITIVE
HSV1 IGG SER QL: 3.2 INDEX
HSV2 IGG SER QL: 0.07 INDEX
IMM GRANULOCYTES NFR BLD AUTO: 0.5 %
KETONES URINE: NEGATIVE
LDLC SERPL CALC-MCNC: 73 MG/DL
LEUKOCYTE ESTERASE URINE: NEGATIVE
LYMPHOCYTES # BLD AUTO: 1.59 K/UL
LYMPHOCYTES NFR BLD AUTO: 12.3 %
MAN DIFF?: NORMAL
MCHC RBC-ENTMCNC: 29.5 PG
MCHC RBC-ENTMCNC: 33.4 GM/DL
MCV RBC AUTO: 88.4 FL
MICROALBUMIN 24H UR DL<=1MG/L-MCNC: <1.2 MG/DL
MICROALBUMIN/CREAT 24H UR-RTO: NORMAL MG/G
MONOCYTES # BLD AUTO: 0.61 K/UL
MONOCYTES NFR BLD AUTO: 4.7 %
NEUTROPHILS # BLD AUTO: 10.58 K/UL
NEUTROPHILS NFR BLD AUTO: 81.9 %
NITRITE URINE: NEGATIVE
NONHDLC SERPL-MCNC: 89 MG/DL
PH URINE: 7
PLATELET # BLD AUTO: 342 K/UL
POTASSIUM SERPL-SCNC: 4.6 MMOL/L
PROT SERPL-MCNC: 7.4 G/DL
PROTEIN URINE: NEGATIVE
PSA SERPL-MCNC: 0.43 NG/ML
RBC # BLD: 5.18 M/UL
RBC # FLD: 12.8 %
SODIUM SERPL-SCNC: 140 MMOL/L
SPECIFIC GRAVITY URINE: 1.01
T PALLIDUM AB SER QL IA: NEGATIVE
TRIGL SERPL-MCNC: 85 MG/DL
TSH SERPL-ACNC: 1.05 UIU/ML
UROBILINOGEN URINE: NORMAL
VIT B12 SERPL-MCNC: 611 PG/ML
WBC # FLD AUTO: 12.91 K/UL

## 2023-02-21 LAB
HSV1 IGM SER QL: NEGATIVE
HSV2 AB FLD-ACNC: NEGATIVE

## 2023-02-22 ENCOUNTER — APPOINTMENT (OUTPATIENT)
Dept: ENDOCRINOLOGY | Facility: CLINIC | Age: 43
End: 2023-02-22

## 2023-03-02 ENCOUNTER — APPOINTMENT (OUTPATIENT)
Dept: ENDOCRINOLOGY | Facility: CLINIC | Age: 43
End: 2023-03-02

## 2023-05-01 ENCOUNTER — APPOINTMENT (OUTPATIENT)
Dept: ENDOCRINOLOGY | Facility: CLINIC | Age: 43
End: 2023-05-01

## 2023-05-01 ENCOUNTER — NON-APPOINTMENT (OUTPATIENT)
Age: 43
End: 2023-05-01

## 2023-05-02 ENCOUNTER — INPATIENT (INPATIENT)
Facility: HOSPITAL | Age: 43
LOS: 0 days | Discharge: HOME | End: 2023-05-03
Attending: HOSPITALIST | Admitting: HOSPITALIST
Payer: COMMERCIAL

## 2023-05-02 VITALS
DIASTOLIC BLOOD PRESSURE: 85 MMHG | SYSTOLIC BLOOD PRESSURE: 131 MMHG | TEMPERATURE: 98 F | OXYGEN SATURATION: 100 % | HEART RATE: 92 BPM | RESPIRATION RATE: 18 BRPM

## 2023-05-02 DIAGNOSIS — E11.9 TYPE 2 DIABETES MELLITUS WITHOUT COMPLICATIONS: ICD-10-CM

## 2023-05-02 DIAGNOSIS — R07.9 CHEST PAIN, UNSPECIFIED: ICD-10-CM

## 2023-05-02 DIAGNOSIS — F17.200 NICOTINE DEPENDENCE, UNSPECIFIED, UNCOMPLICATED: ICD-10-CM

## 2023-05-02 LAB
ALBUMIN SERPL ELPH-MCNC: 5.1 G/DL — HIGH (ref 3.3–5)
ALP SERPL-CCNC: 43 U/L — SIGNIFICANT CHANGE UP (ref 40–120)
ALT FLD-CCNC: 14 U/L — SIGNIFICANT CHANGE UP (ref 4–41)
ANION GAP SERPL CALC-SCNC: 14 MMOL/L — SIGNIFICANT CHANGE UP (ref 7–14)
APPEARANCE UR: CLEAR — SIGNIFICANT CHANGE UP
APTT BLD: 33.9 SEC — SIGNIFICANT CHANGE UP (ref 27–36.3)
AST SERPL-CCNC: 13 U/L — SIGNIFICANT CHANGE UP (ref 4–40)
BASOPHILS # BLD AUTO: 0.03 K/UL — SIGNIFICANT CHANGE UP (ref 0–0.2)
BASOPHILS NFR BLD AUTO: 0.4 % — SIGNIFICANT CHANGE UP (ref 0–2)
BILIRUB SERPL-MCNC: 0.6 MG/DL — SIGNIFICANT CHANGE UP (ref 0.2–1.2)
BILIRUB UR-MCNC: NEGATIVE — SIGNIFICANT CHANGE UP
BUN SERPL-MCNC: 18 MG/DL — SIGNIFICANT CHANGE UP (ref 7–23)
CALCIUM SERPL-MCNC: 9.8 MG/DL — SIGNIFICANT CHANGE UP (ref 8.4–10.5)
CHLORIDE SERPL-SCNC: 101 MMOL/L — SIGNIFICANT CHANGE UP (ref 98–107)
CK MB BLD-MCNC: 2.4 % — SIGNIFICANT CHANGE UP (ref 0–2.5)
CK MB CFR SERPL CALC: 2.2 NG/ML — SIGNIFICANT CHANGE UP
CK SERPL-CCNC: 91 U/L — SIGNIFICANT CHANGE UP (ref 30–200)
CO2 SERPL-SCNC: 26 MMOL/L — SIGNIFICANT CHANGE UP (ref 22–31)
COLOR SPEC: SIGNIFICANT CHANGE UP
CREAT SERPL-MCNC: 0.84 MG/DL — SIGNIFICANT CHANGE UP (ref 0.5–1.3)
DIFF PNL FLD: NEGATIVE — SIGNIFICANT CHANGE UP
EGFR: 112 ML/MIN/1.73M2 — SIGNIFICANT CHANGE UP
EOSINOPHIL # BLD AUTO: 0.02 K/UL — SIGNIFICANT CHANGE UP (ref 0–0.5)
EOSINOPHIL NFR BLD AUTO: 0.2 % — SIGNIFICANT CHANGE UP (ref 0–6)
GLUCOSE BLDC GLUCOMTR-MCNC: 128 MG/DL — HIGH (ref 70–99)
GLUCOSE SERPL-MCNC: 72 MG/DL — SIGNIFICANT CHANGE UP (ref 70–99)
GLUCOSE UR QL: NEGATIVE — SIGNIFICANT CHANGE UP
HCT VFR BLD CALC: 45.9 % — SIGNIFICANT CHANGE UP (ref 39–50)
HGB BLD-MCNC: 15.6 G/DL — SIGNIFICANT CHANGE UP (ref 13–17)
IANC: 4.98 K/UL — SIGNIFICANT CHANGE UP (ref 1.8–7.4)
IMM GRANULOCYTES NFR BLD AUTO: 0.4 % — SIGNIFICANT CHANGE UP (ref 0–0.9)
INR BLD: 1.09 RATIO — SIGNIFICANT CHANGE UP (ref 0.88–1.16)
KETONES UR-MCNC: ABNORMAL
LEUKOCYTE ESTERASE UR-ACNC: NEGATIVE — SIGNIFICANT CHANGE UP
LIDOCAIN IGE QN: 12 U/L — SIGNIFICANT CHANGE UP (ref 7–60)
LYMPHOCYTES # BLD AUTO: 2.86 K/UL — SIGNIFICANT CHANGE UP (ref 1–3.3)
LYMPHOCYTES # BLD AUTO: 33.8 % — SIGNIFICANT CHANGE UP (ref 13–44)
MAGNESIUM SERPL-MCNC: 2.3 MG/DL — SIGNIFICANT CHANGE UP (ref 1.6–2.6)
MCHC RBC-ENTMCNC: 29.3 PG — SIGNIFICANT CHANGE UP (ref 27–34)
MCHC RBC-ENTMCNC: 34 GM/DL — SIGNIFICANT CHANGE UP (ref 32–36)
MCV RBC AUTO: 86.3 FL — SIGNIFICANT CHANGE UP (ref 80–100)
MONOCYTES # BLD AUTO: 0.53 K/UL — SIGNIFICANT CHANGE UP (ref 0–0.9)
MONOCYTES NFR BLD AUTO: 6.3 % — SIGNIFICANT CHANGE UP (ref 2–14)
NEUTROPHILS # BLD AUTO: 4.98 K/UL — SIGNIFICANT CHANGE UP (ref 1.8–7.4)
NEUTROPHILS NFR BLD AUTO: 58.9 % — SIGNIFICANT CHANGE UP (ref 43–77)
NITRITE UR-MCNC: NEGATIVE — SIGNIFICANT CHANGE UP
NRBC # BLD: 0 /100 WBCS — SIGNIFICANT CHANGE UP (ref 0–0)
NRBC # FLD: 0 K/UL — SIGNIFICANT CHANGE UP (ref 0–0)
NT-PROBNP SERPL-SCNC: 16 PG/ML — SIGNIFICANT CHANGE UP
PH UR: 6.5 — SIGNIFICANT CHANGE UP (ref 5–8)
PLATELET # BLD AUTO: 338 K/UL — SIGNIFICANT CHANGE UP (ref 150–400)
POTASSIUM SERPL-MCNC: 3.5 MMOL/L — SIGNIFICANT CHANGE UP (ref 3.5–5.3)
POTASSIUM SERPL-SCNC: 3.5 MMOL/L — SIGNIFICANT CHANGE UP (ref 3.5–5.3)
PROT SERPL-MCNC: 7.8 G/DL — SIGNIFICANT CHANGE UP (ref 6–8.3)
PROT UR-MCNC: NEGATIVE — SIGNIFICANT CHANGE UP
PROTHROM AB SERPL-ACNC: 12.7 SEC — SIGNIFICANT CHANGE UP (ref 10.5–13.4)
RBC # BLD: 5.32 M/UL — SIGNIFICANT CHANGE UP (ref 4.2–5.8)
RBC # FLD: 12.8 % — SIGNIFICANT CHANGE UP (ref 10.3–14.5)
SODIUM SERPL-SCNC: 141 MMOL/L — SIGNIFICANT CHANGE UP (ref 135–145)
SP GR SPEC: 1.01 — SIGNIFICANT CHANGE UP (ref 1.01–1.05)
TROPONIN T, HIGH SENSITIVITY RESULT: 6 NG/L — SIGNIFICANT CHANGE UP
TROPONIN T, HIGH SENSITIVITY RESULT: 7 NG/L — SIGNIFICANT CHANGE UP
TROPONIN T, HIGH SENSITIVITY RESULT: <6 NG/L — SIGNIFICANT CHANGE UP
UROBILINOGEN FLD QL: SIGNIFICANT CHANGE UP
WBC # BLD: 8.45 K/UL — SIGNIFICANT CHANGE UP (ref 3.8–10.5)
WBC # FLD AUTO: 8.45 K/UL — SIGNIFICANT CHANGE UP (ref 3.8–10.5)

## 2023-05-02 PROCEDURE — 99285 EMERGENCY DEPT VISIT HI MDM: CPT

## 2023-05-02 PROCEDURE — 93010 ELECTROCARDIOGRAM REPORT: CPT

## 2023-05-02 PROCEDURE — 71046 X-RAY EXAM CHEST 2 VIEWS: CPT | Mod: 26

## 2023-05-02 PROCEDURE — 99223 1ST HOSP IP/OBS HIGH 75: CPT

## 2023-05-02 RX ORDER — LANOLIN ALCOHOL/MO/W.PET/CERES
3 CREAM (GRAM) TOPICAL AT BEDTIME
Refills: 0 | Status: DISCONTINUED | OUTPATIENT
Start: 2023-05-02 | End: 2023-05-03

## 2023-05-02 RX ORDER — GLUCAGON INJECTION, SOLUTION 0.5 MG/.1ML
1 INJECTION, SOLUTION SUBCUTANEOUS ONCE
Refills: 0 | Status: DISCONTINUED | OUTPATIENT
Start: 2023-05-02 | End: 2023-05-03

## 2023-05-02 RX ORDER — SODIUM CHLORIDE 9 MG/ML
1000 INJECTION, SOLUTION INTRAVENOUS
Refills: 0 | Status: DISCONTINUED | OUTPATIENT
Start: 2023-05-02 | End: 2023-05-03

## 2023-05-02 RX ORDER — ASPIRIN/CALCIUM CARB/MAGNESIUM 324 MG
81 TABLET ORAL DAILY
Refills: 0 | Status: DISCONTINUED | OUTPATIENT
Start: 2023-05-02 | End: 2023-05-03

## 2023-05-02 RX ORDER — ACETAMINOPHEN 500 MG
650 TABLET ORAL EVERY 6 HOURS
Refills: 0 | Status: DISCONTINUED | OUTPATIENT
Start: 2023-05-02 | End: 2023-05-03

## 2023-05-02 RX ORDER — INSULIN LISPRO 100/ML
VIAL (ML) SUBCUTANEOUS
Refills: 0 | Status: DISCONTINUED | OUTPATIENT
Start: 2023-05-02 | End: 2023-05-03

## 2023-05-02 RX ORDER — HEPARIN SODIUM 5000 [USP'U]/ML
INJECTION INTRAVENOUS; SUBCUTANEOUS
Qty: 25000 | Refills: 0 | Status: DISCONTINUED | OUTPATIENT
Start: 2023-05-02 | End: 2023-05-03

## 2023-05-02 RX ORDER — DEXTROSE 50 % IN WATER 50 %
25 SYRINGE (ML) INTRAVENOUS ONCE
Refills: 0 | Status: DISCONTINUED | OUTPATIENT
Start: 2023-05-02 | End: 2023-05-03

## 2023-05-02 RX ORDER — NITROGLYCERIN 6.5 MG
0.4 CAPSULE, EXTENDED RELEASE ORAL ONCE
Refills: 0 | Status: COMPLETED | OUTPATIENT
Start: 2023-05-02 | End: 2023-05-02

## 2023-05-02 RX ORDER — DEXTROSE 50 % IN WATER 50 %
15 SYRINGE (ML) INTRAVENOUS ONCE
Refills: 0 | Status: DISCONTINUED | OUTPATIENT
Start: 2023-05-02 | End: 2023-05-03

## 2023-05-02 RX ORDER — ONDANSETRON 8 MG/1
4 TABLET, FILM COATED ORAL EVERY 8 HOURS
Refills: 0 | Status: DISCONTINUED | OUTPATIENT
Start: 2023-05-02 | End: 2023-05-03

## 2023-05-02 RX ORDER — INSULIN DEGLUDEC 100 U/ML
15 INJECTION, SOLUTION SUBCUTANEOUS
Refills: 0 | DISCHARGE

## 2023-05-02 RX ORDER — INSULIN LISPRO 100/ML
VIAL (ML) SUBCUTANEOUS AT BEDTIME
Refills: 0 | Status: DISCONTINUED | OUTPATIENT
Start: 2023-05-02 | End: 2023-05-03

## 2023-05-02 RX ORDER — TICAGRELOR 90 MG/1
180 TABLET ORAL ONCE
Refills: 0 | Status: COMPLETED | OUTPATIENT
Start: 2023-05-02 | End: 2023-05-02

## 2023-05-02 RX ORDER — HEPARIN SODIUM 5000 [USP'U]/ML
4100 INJECTION INTRAVENOUS; SUBCUTANEOUS EVERY 6 HOURS
Refills: 0 | Status: DISCONTINUED | OUTPATIENT
Start: 2023-05-02 | End: 2023-05-03

## 2023-05-02 RX ORDER — ATORVASTATIN CALCIUM 80 MG/1
40 TABLET, FILM COATED ORAL AT BEDTIME
Refills: 0 | Status: DISCONTINUED | OUTPATIENT
Start: 2023-05-02 | End: 2023-05-03

## 2023-05-02 RX ORDER — HEPARIN SODIUM 5000 [USP'U]/ML
4100 INJECTION INTRAVENOUS; SUBCUTANEOUS ONCE
Refills: 0 | Status: DISCONTINUED | OUTPATIENT
Start: 2023-05-02 | End: 2023-05-03

## 2023-05-02 RX ORDER — DEXTROSE 50 % IN WATER 50 %
12.5 SYRINGE (ML) INTRAVENOUS ONCE
Refills: 0 | Status: DISCONTINUED | OUTPATIENT
Start: 2023-05-02 | End: 2023-05-03

## 2023-05-02 RX ADMIN — TICAGRELOR 180 MILLIGRAM(S): 90 TABLET ORAL at 21:10

## 2023-05-02 RX ADMIN — Medication 0.4 MILLIGRAM(S): at 21:10

## 2023-05-02 NOTE — H&P ADULT - NSICDXFAMILYHX_GEN_ALL_CORE_FT
FAMILY HISTORY:  Father  Still living? Unknown  FH: type 2 diabetes mellitus, Age at diagnosis: Age Unknown    Mother  Still living? Unknown  FH: type 2 diabetes mellitus, Age at diagnosis: Age Unknown

## 2023-05-02 NOTE — ED ADULT TRIAGE NOTE - CHIEF COMPLAINT QUOTE
c/o left sided c/p that radiates down the left arm, reports pain is progressively  worse  for the past 2 days, with some ADAMS today. hx of DM.

## 2023-05-02 NOTE — H&P ADULT - NSHPREVIEWOFSYSTEMS_GEN_ALL_CORE
REVIEW OF SYSTEMS:    CONSTITUTIONAL: No weakness, fevers or chills  EYES/ENT: No visual changes;  No dysphagia; No sore throat; No rhinorrhea; No sinus pain/pressure  NECK: No pain or stiffness  RESPIRATORY: No cough, wheezing, hemoptysis; No shortness of breath  CARDIOVASCULAR: + chest pain no  palpitations; No lower extremity edema  GASTROINTESTINAL: No abdominal or epigastric pain. No nausea, vomiting, or hematemesis; No diarrhea or constipation. No melena or hematochezia.  GENITOURINARY: No dysuria, frequency or hematuria  NEUROLOGICAL: No numbness or weakness + headache s/p nitroglycerin   MSK: ambulates without assistance   PSYCH: + stress   SKIN: No itching, burning, rashes, or lesions   All other review of systems is negative unless indicated above.

## 2023-05-02 NOTE — CHART NOTE - NSCHARTNOTEFT_GEN_A_CORE
This is 42 year old man, Active Smoker with past medical history with Type 2 Diabetes Mellitus with history of DM, chronic smoker who presents with stuttering garima of chest pain.   EKG is benign without significant ST changes. Patient reports reduction in chest pain but continues to have chest pain rated 4/10 with no radiation to left arm. Troponin 7 and repeat ECG remains without ischemic changes.  Patient has refused heparin gtt.  It was discussed with patient the importance of heparin gtt in treatment of ACS that includes Unstable Angina.  Patient and wife expressed understanding but continue to decline Heparin gtt for now.  Patient has consented to SL NTG.       Impression:  UA vs. NSTEMI  DM (on home insulin)  Chronic smoker    Plan:  - Admit to inpatient telemetry   - S/p asa 4 tabs at urgent care  - Would empirically dose brillinta 180mg x1 now  - Hep bolus and gtt  - Can trial SLN PRN for pain x3. Denies sildenafil use or prescription   - Trend ck, ckmb hstrop x2, EKGs with ongoing chest pain. If ultimately negative, patient would benefit from TTE and further ischemic risk stratification (Coronary CT - which may show nonspecific underlying plaque given his risk factors, vs. treadmill nuc stress)    Zheng Green, ANGEL  [id number]

## 2023-05-02 NOTE — H&P ADULT - PROBLEM SELECTOR PLAN 1
New  - EKG nonischemic and troponins 7-> 6 -> <6, CKMB mass assay negative x2  - no prior LHC or ECHO   - JOSE J score 0, HEART score 1, Sabrina score 40  * trend troponins  * monitor on telemetry  * cardiology consulted: Ticagrelor 180mg x1, heparin drip (pt declining heparin drip at this time), TTE +/- stress test, (stress test ordered so pt can be put on the waiting list pending ECHO)  -Start aspirin 81mg daily and atorvastatin 40mg nightly pending above

## 2023-05-02 NOTE — CONSULT NOTE ADULT - SUBJECTIVE AND OBJECTIVE BOX
Registration Time: 17:57  Initial ECG: _  Saw patient at bedside: 17:57  Called Cath Attending:  Balloon/device time:    Patient seen and evaluated at bedside    Reason for consult: STEMI    HPI: 43yo with history of DM, chronic smoker, who presents with chest pain that worsened over the past 2-3 days. Patient has history of 3 months of chest discomfort, intermittent, not necesssarily worsened with exertion. Over the past 3 days, this pain has become more frequent. It is currently a 5/10 pain, substernal, with now radiation down his left arm. He denies any lightheadedness, dizziness, palpitations, dyspnea (in fact deep breaths provide him with mild relief), nausea or vomiting, diaphoresis.   He does not follow with a cardiologist but he does have a primary care doctor who recently started him on long standing insulin (lantus 18u qHS) for his diabetes. Previously on metformin, no longer taking.  Due to his symptoms, he took 4 baby aspirins, and presented to urgent care. There, they were concerned about EKG findings and called EMS. They offered to bring him to [facility] but patient deferred and instead drove himself to the [facility] ED.     PMHx:   DM on insulin    PSHx: Denies    Home Meds: Lantus 18u qHS, motrin PRN headache otherwise nothing    Allergies:  No Known Allergies    FAMILY HISTORY: Denies significant family cardiac history     Social History: CHronic smoker, with 3-4 cigarettes daily for years. He recreationally drinks during celebratory occasions "but not to get drunk." Remote marijuana use. No heroin, cocaine, IVDU etc   His wife recently gave birth to baby and has adjusting to new family and responsibilities     Review of Systems: 14pt ros neg unless stated above    Physical Exam:  T(F): 98 (05-02), Max: 98 (05-02)  HR: 92 (05-02) (92 - 92)  BP: 131/85 (05-02) (131/85 - 131/85)  RR: 18 (05-02)  SpO2: 100% (05-02)  GENERAL: No acute distress, well-developed  HEAD:  Atraumatic, Normocephalic  ENT: EOMI, PERRLA, conjunctiva and sclera clear, Neck supple, No JVD, moist mucosa  CHEST/LUNG: Clear to auscultation bilaterally; No wheeze, equal breath sounds bilaterally   BACK: No spinal tenderness  HEART: Regular rate and rhythm; No murmurs, rubs, or gallops  ABDOMEN: Soft, Nontender, Nondistended; Bowel sounds present  EXTREMITIES:  No clubbing, cyanosis, or edema  PSYCH: Nl behavior, nl affect  NEUROLOGY: AAOx3, non-focal, cranial nerves intact  SKIN: Normal color, No rashes or lesions    ECG: Urgent care -   sinus rhythm, normal axis, normal intervals, with submmSTE in II, anterior leads that may be seen in repolarization. NO Q waves     Labs: Personally reviewed                 Registration Time: 17:57  Initial EC:15  Saw patient at bedside: 17:57    Patient seen and evaluated at bedside    Reason for consult: UA vs. NSTEMI     HPI: 43yo with history of DM, chronic smoker, who presents with chest pain that worsened over the past 2-3 days. Patient has history of 3 months of chest discomfort, intermittent, not necesssarily worsened with exertion. Over the past 3 days, this pain has become more frequent. It is currently a 5/10 pain, substernal, with now radiation down his left arm. He denies any lightheadedness, dizziness, palpitations, dyspnea (in fact deep breaths provide him with mild relief), nausea or vomiting, diaphoresis.   He does not follow with a cardiologist but he does have a primary care doctor who recently started him on long standing insulin (lantus 18u qHS) for his diabetes. Previously on metformin, no longer taking.  Due to his symptoms, he took 4 baby aspirins, and presented to urgent care. There, they were concerned about EKG findings and called EMS. They offered to bring him to [facility] but patient deferred and instead drove himself to the [facility] ED.     Interval events: Seen in the ED triage area on arrival, where patient was with /85, satting 99% Ra, HR 80. Finger bsg 82    PMHx:   DM on insulin    PSHx: Denies    Home Meds: Lantus 18u qHS, motrin PRN headache otherwise nothing    Allergies:  No Known Allergies    FAMILY HISTORY: Denies significant family cardiac history     Social History: Chronic smoker, with 3-4 cigarettes daily for years. He recreationally drinks during celebratory occasions "but not to get drunk." Remote marijuana use. No heroin, cocaine, IVDU etc   His wife recently gave birth to baby 4 months and has adjusting to new family and responsibilities; he was stressed in the first 2 months but has adjusted and is enjoying fatherhood. 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     Review of Systems: 14pt ros neg unless stated above    Physical Exam:  T(F): 98 (05-02), Max: 98 (05-02)  HR: 92 (05-02) (92 - 92)  BP: 131/85 (05-02) (131/85 - 131/85)  RR: 18 (05-02)  SpO2: 100% (-)  GENERAL: Mild discomfort, clutching his arm  HEAD:  Atraumatic, Normocephalic  ENT: EOMI, PERRLA, conjunctiva and sclera clear, Neck supple, No JVD, moist mucosa  CHEST/LUNG: Clear to auscultation bilaterally; No wheeze, equal breath sounds bilaterally   BACK: No spinal tenderness  HEART: Regular rate and rhythm; No murmurs, rubs, or gallops  ABDOMEN: Soft, Nontender, Nondistended; Bowel sounds present  EXTREMITIES:  No clubbing, cyanosis, or edema  PSYCH: Nl behavior, nl affect  NEUROLOGY: AAOx3, non-focal, cranial nerves intact  SKIN: Normal color, No rashes or lesions    ECG: Urgent care -   sinus rhythm, normal axis, normal intervals, with submmSTE in II, anterior leads that may be seen in repolarization. NO Q waves    ED: sinus rhythm, normal axis, no significant ST deviations. Suggestion of LAE     Labs: Pending                 Registration Time: 17:57  Initial EC:15  Saw patient at bedside: 17:57    Patient seen and evaluated at bedside    Reason for consult: UA vs. NSTEMI     HPI: 43yo with history of DM, chronic smoker, who presents with chest pain that worsened over the past 2-3 days. Patient has history of 3 months of chest discomfort, intermittent, not necesssarily worsened with exertion. Over the past 3 days, this pain has become more frequent. It is currently a 5/10 pain, substernal, with now radiation down his left arm. He denies any lightheadedness, dizziness, palpitations, dyspnea (in fact deep breaths provide him with mild relief), nausea or vomiting, diaphoresis.   He does not follow with a cardiologist but he does have a primary care doctor who recently started him on long standing insulin (lantus 18u qHS) for his diabetes. Previously on metformin, no longer taking.  Due to his symptoms, he took 4 baby aspirins, and presented to urgent care. There, they were concerned about EKG findings and called EMS. They offered to bring him to [facility] but patient deferred and instead drove himself to the [facility] ED.     Interval events: Seen in the ED triage area on arrival, where patient was with /85, satting 99% Ra, HR 80. Finger bsg 82    PMHx:   DM on insulin    PSHx: Denies    Home Meds: Lantus 18u qHS, motrin PRN headache otherwise nothing. Denies sildenafil use     Allergies:  No Known Allergies    FAMILY HISTORY: Denies significant family cardiac history     Social History: Chronic smoker, with 3-4 cigarettes daily for years. He recreationally drinks during celebratory occasions "but not to get drunk." Remote marijuana use. No heroin, cocaine, IVDU etc   His wife recently gave birth to baby 4 months and has adjusting to new family and responsibilities; he was stressed in the first 2 months but has adjusted and is enjoying fatherhood. 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     Review of Systems: 14pt ros neg unless stated above    Physical Exam:  T(F): 98 (05-02), Max: 98 (05-02)  HR: 92 (-02) (92 - 92)  BP: 131/85 (-02) (131/85 - 131/85)  RR: 18 (-)  SpO2: 100% ()  GENERAL: Mild discomfort, clutching his arm  HEAD:  Atraumatic, Normocephalic  ENT: EOMI, PERRLA, conjunctiva and sclera clear, Neck supple, No JVD, moist mucosa  CHEST/LUNG: Clear to auscultation bilaterally; No wheeze, equal breath sounds bilaterally   BACK: No spinal tenderness  HEART: Regular rate and rhythm; No murmurs, rubs, or gallops  ABDOMEN: Soft, Nontender, Nondistended; Bowel sounds present  EXTREMITIES:  No clubbing, cyanosis, or edema  PSYCH: Nl behavior, nl affect  NEUROLOGY: AAOx3, non-focal, cranial nerves intact  SKIN: Normal color, No rashes or lesions    ECG: Urgent care -   sinus rhythm, normal axis, normal intervals, with submmSTE in II, anterior leads that may be seen in repolarization. NO Q waves    ED: sinus rhythm, normal axis, no significant ST deviations. Suggestion of LAE     Labs: Pending                 Registration Time: 17:57  Initial EC:15  Saw patient at bedside: 17:57    Patient seen and evaluated at bedside    Reason for consult: UA vs. NSTEMI     HPI: 43yo with history of DM, chronic smoker, who presents with chest pain that worsened over the past 2-3 days. Patient has history of 3 months of chest discomfort, intermittent, not necesssarily worsened with exertion. Over the past 3 days, this pain has become more frequent. It is currently a 5/10 pain, substernal, with now radiation down his left arm. He denies any lightheadedness, dizziness, palpitations, dyspnea (in fact deep breaths provide him with mild relief), nausea or vomiting, diaphoresis.   He does not follow with a cardiologist but he does have a primary care doctor who recently started him on long standing insulin (lantus 18u qHS) for his diabetes. Previously on metformin, no longer taking.  Due to his symptoms, he took 4 baby aspirins, and presented to urgent care. There, they were concerned about EKG findings and called EMS. [facility] but patient deferred and instead drove himself to the [facility] ED.     Interval events: Seen in the ED triage area on arrival, where patient was with /85, satting 99% Ra, HR 80. Finger bsg 82    PMHx:   DM on insulin    PSHx: Denies    Home Meds: Lantus 18u qHS, motrin PRN headache otherwise nothing. Denies sildenafil use     Allergies:  No Known Allergies    FAMILY HISTORY: Denies significant family cardiac history     Social History: Chronic smoker, with 3-4 cigarettes daily for years. He recreationally drinks during celebratory occasions "but not to get drunk." Remote marijuana use. No heroin, cocaine, IVDU etc   Works in real estate  His wife recently gave birth to baby 4 months and has adjusting to new family and responsibilities; he was stressed in the first 2 months but has adjusted and is enjoying fatherhood. 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     Review of Systems: 14pt ros neg unless stated above    Physical Exam:  T(F): 98 (05-02), Max: 98 (05-02)  HR: 92 (-) (92 - 92)  BP: 131/85 (-) (131/85 - 131/)  RR: 18 (-)  SpO2: 100% ()  GENERAL: Mild discomfort, clutching his arm  HEAD:  Atraumatic, Normocephalic  ENT: EOMI, PERRLA, conjunctiva and sclera clear, Neck supple, No JVD, moist mucosa  CHEST/LUNG: Clear to auscultation bilaterally; No wheeze, equal breath sounds bilaterally   BACK: No spinal tenderness  HEART: Regular rate and rhythm; No murmurs, rubs, or gallops  ABDOMEN: Soft, Nontender, Nondistended; Bowel sounds present  EXTREMITIES:  No clubbing, cyanosis, or edema  PSYCH: Nl behavior, nl affect  NEUROLOGY: AAOx3, non-focal, cranial nerves intact  SKIN: Normal color, No rashes or lesions    ECG: Urgent care -   sinus rhythm, normal axis, normal intervals, with submmSTE in II, anterior leads that may be seen in repolarization. NO Q waves    ED EKG: sinus rhythm, normal axis, no significant ST deviations. Suggestion of LAE     Labs: Pending

## 2023-05-02 NOTE — H&P ADULT - NSHPPHYSICALEXAM_GEN_ALL_CORE
PHYSICAL EXAM:  GENERAL: NAD, comfortable at bedside   HEAD:  Atraumatic, Normocephalic  EYES: EOMI, PERRL, conjunctiva and sclera clear  NECK: Supple, No JVD  CHEST/LUNG: Clear to auscultation bilaterally; No wheezes, rales or rhonchi  HEART: Regular rate and rhythm; No murmurs, rubs, or gallops, (+)S1, S2  ABDOMEN: Soft, Nontender, Nondistended; Normal Bowel sounds   EXTREMITIES:  2+ Peripheral Pulses, No clubbing, cyanosis, or edema  PSYCH: normal mood and affect  NEUROLOGY: AAOx3, non-focal  SKIN: No rashes or lesions

## 2023-05-02 NOTE — H&P ADULT - PROBLEM SELECTOR PLAN 2
Chronic stable  BG 72  Home regimen: Tresiba 15-18 units nightly  Pt would like to bring in his own medication for basal insulin use  Agreeable to [facility]   Diabetic diet  A1c and lipid panel in AM

## 2023-05-02 NOTE — CONSULT NOTE ADULT - ASSESSMENT
43yo with history of DM, chronic smoker who presents with stuttering garima of chest pain.   EKG is benign without significant ST changes.   Given nature of presentation, will manage as ACS medically while awaiting trended EKG and enzymes    Impression:  UA vs. NSTEMI  DM (on home insulin)  Chronic smoker    Plan:  - S/p asa 4 tabs at urgent care  - Would empirically dose brillinta 180mg x1 now  - Hep bolus and gtt  - Trend hstrop x2, EKG with ongoing chest pain  - Can trial SLN PRN     The following was discussed during shift w/ Dr. Isela Shine 41yo with history of DM, chronic smoker who presents with stuttering garima of chest pain.   EKG is benign without significant ST changes.   Given nature of presentation and risk factors, will manage as ACS medically while awaiting trended EKG and enzymes. At present time, no indication for cath lab activation.     Impression:  UA vs. NSTEMI  DM (on home insulin)  Chronic smoker    Plan:  - Admit to inpatient telemetry   - S/p asa 4 tabs at urgent care  - Would empirically dose brillinta 180mg x1 now  - Hep bolus and gtt  - Can trial SLN PRN for pain x3. Denies sildenafil use or prescription   - Trend hstrop x2, EKGs with ongoing chest pain. If ultimately negative, patient would benefit from TTE and further ischemic risk stratification (Coronary CT - which may show underlying plaque given his risk factors, vs. treadmill nuc stress)    The following was discussed during shift w/ interventional cardiology attending Dr. Isela Shine 41yo with history of DM, chronic smoker who presents with stuttering garima of chest pain.   EKG is benign without significant ST changes.   Given nature of presentation and risk factors, will manage as ACS medically while awaiting trended EKGs and enzymes. At present time, no indication for cath lab activation.     Impression:  UA vs. NSTEMI  DM (on home insulin)  Chronic smoker    Plan:  - Admit to inpatient telemetry   - S/p asa 4 tabs at urgent care  - Would empirically dose brillinta 180mg x1 now  - Hep bolus and gtt  - Can trial SLN PRN for pain x3. Denies sildenafil use or prescription   - Trend hstrop x2, EKGs with ongoing chest pain. If ultimately negative, patient would benefit from TTE and further ischemic risk stratification (Coronary CT - which may show underlying plaque given his risk factors, vs. treadmill nuc stress)    The following was discussed during shift w/ interventional cardiology attending Dr. Isela Shine 41yo with history of DM, chronic smoker who presents with stuttering garima of chest pain.   EKG is benign without significant ST changes.   Given nature of presentation and risk factors, will manage as ACS medically while awaiting trended EKGs and enzymes. At present time, no indication for cath lab activation.     Impression:  UA vs. NSTEMI  DM (on home insulin)  Chronic smoker    Plan:  - Admit to inpatient telemetry   - S/p asa 4 tabs at urgent care  - Would empirically dose brillinta 180mg x1 now  - Hep bolus and gtt  - Can trial SLN PRN for pain x3. Denies sildenafil use or prescription   - Trend hstrop x2, EKGs with ongoing chest pain. If ultimately negative, patient would benefit from TTE and further ischemic risk stratification (Coronary CT - which may show nonspecific underlying plaque given his risk factors, vs. treadmill nuc stress)    The following was discussed during shift w/ interventional cardiology attending Dr. Isela Shine

## 2023-05-02 NOTE — H&P ADULT - NSHPLABSRESULTS_GEN_ALL_CORE
labs personally reviewed and pertinent [facility] documents/labs/diagnostics reviewed                         15.6   8.45  )-----------( 338      ( 02 May 2023 19:10 )             45.9       05-02    141  |  101  |  18  ----------------------------<  72  3.5   |  26  |  0.84    Ca    9.8      02 May 2023 19:10  Mg     2.30     05-    TPro  7.8  /  Alb  5.1<H>  /  TBili  0.6  /  DBili  x   /  AST  13  /  ALT  14  /  AlkPhos  43  05-02      CARDIAC MARKERS ( 02 May 2023 21:20 )  x     / x     / 91 U/L / x     / 2.2 ng/mL  CARDIAC MARKERS ( 02 May 2023 19:10 )  x     / x     / 99 U/L / x     / 2.3 ng/mL        PT/INR - ( 02 May 2023 19:10 )   PT: 12.7 sec;   INR: 1.09 ratio         PTT - ( 02 May 2023 19:10 )  PTT:33.9 sec        Urinalysis Basic - ( 02 May 2023 19:18 )    Color: Light Yellow / Appearance: Clear / S.011 / pH: x  Gluc: x / Ketone: Trace  / Bili: Negative / Urobili: <2 mg/dL   Blood: x / Protein: Negative / Nitrite: Negative   Leuk Esterase: Negative / RBC: x / WBC x   Sq Epi: x / Non Sq Epi: x / Bacteria: x      CXR interpreted by myself: clear lungs   EKG interpreted by myself: nonischemic

## 2023-05-02 NOTE — ED ADULT NURSE NOTE - OBJECTIVE STATEMENT
Pt received into intake. pt reports chest pain x 2 dyas on the left side of the chest. pt states pain radiates down to the left arm. Pt states the pain became worse today. Pulses equal bilaterally. Respirations are even and unlabored. Pt denies SOB, N/V/D, dizziness, headaches, and abdominal pain. Pt has a 20 G to the left AC. pt is normal sinus on the tele monitor. PMH of DM. Pt stated he wanted to wait for his blood results to come back, before taking the Brilinta. Will continue to monitor.

## 2023-05-02 NOTE — H&P ADULT - PROBLEM SELECTOR PLAN 3
Chronic  Smokes 3 cigarettes daily  Declined nicotine patch  Counseling on smoking cessation upon discharge

## 2023-05-02 NOTE — ED PROVIDER NOTE - CLINICAL SUMMARY MEDICAL DECISION MAKING FREE TEXT BOX
43 yo M, everyday smoker, hx DM2, presenting with complaints of constant chest pain radiating down L arm that started when he woke up this morning. Pt states he has had similar pain but without radiation that has been intermittent over the past month. Denies nausea/vomiting or diaphoresis. No known inciting/provoking factors. EKG without STEMI. Troponin negative. Plan for labs, cxr, tele, tba for ACS work up

## 2023-05-02 NOTE — H&P ADULT - HISTORY OF PRESENT ILLNESS
42 yr old male with a pmh of T2DM on insulin and tobacco use who presents with an ~3 month history of intermittent left sided 0-6/10 chest discomfort (that he describes as a squeezing sensation). The discomfort has become constant over the past 2-3 days and today it radiated down his left arm causing a pins and needles sensation in his arm.   He went to urgent care and was told to go to the ED for further evaluation.   Pt has had these symptoms in the past but never this severe/constant   Of note: ~3 months ago patient had symptoms of weight loss and "drenching of sweat at night" per wife. It was around this time that his T2DM was found to be uncontrolled and he was transitioned to insulin with good control. He has since regained some of this weight loss and no longer has the night sweats.   Pt also under increased amount of stress over the past 3-4 months with the arrival of their  child. Though he has adjusted to the , he still seems stressed to family and they are concerned and have sent him to see a therapist last week.   Denies  dizziness,  palpitations, SOB, abdominal pain, joint pain, diarrhea/constipation, urinary symptoms.   Vitals: T 98, HR 86, /85, RR 18 satting 100% RA

## 2023-05-03 ENCOUNTER — TRANSCRIPTION ENCOUNTER (OUTPATIENT)
Age: 43
End: 2023-05-03

## 2023-05-03 VITALS
TEMPERATURE: 98 F | DIASTOLIC BLOOD PRESSURE: 89 MMHG | SYSTOLIC BLOOD PRESSURE: 130 MMHG | OXYGEN SATURATION: 100 % | HEART RATE: 69 BPM | RESPIRATION RATE: 17 BRPM

## 2023-05-03 DIAGNOSIS — I10 ESSENTIAL (PRIMARY) HYPERTENSION: ICD-10-CM

## 2023-05-03 DIAGNOSIS — E78.5 HYPERLIPIDEMIA, UNSPECIFIED: ICD-10-CM

## 2023-05-03 LAB
A1C WITH ESTIMATED AVERAGE GLUCOSE RESULT: 5.5 % — SIGNIFICANT CHANGE UP (ref 4–5.6)
ANION GAP SERPL CALC-SCNC: 14 MMOL/L — SIGNIFICANT CHANGE UP (ref 7–14)
BASOPHILS # BLD AUTO: 0.04 K/UL — SIGNIFICANT CHANGE UP (ref 0–0.2)
BASOPHILS NFR BLD AUTO: 0.4 % — SIGNIFICANT CHANGE UP (ref 0–2)
BUN SERPL-MCNC: 26 MG/DL — HIGH (ref 7–23)
CALCIUM SERPL-MCNC: 9.3 MG/DL — SIGNIFICANT CHANGE UP (ref 8.4–10.5)
CHLORIDE SERPL-SCNC: 103 MMOL/L — SIGNIFICANT CHANGE UP (ref 98–107)
CHOLEST SERPL-MCNC: 142 MG/DL — SIGNIFICANT CHANGE UP
CK MB CFR SERPL CALC: 2.6 NG/ML — SIGNIFICANT CHANGE UP
CO2 SERPL-SCNC: 21 MMOL/L — LOW (ref 22–31)
CREAT SERPL-MCNC: 0.86 MG/DL — SIGNIFICANT CHANGE UP (ref 0.5–1.3)
CRP SERPL-MCNC: <3 MG/L — SIGNIFICANT CHANGE UP
EGFR: 112 ML/MIN/1.73M2 — SIGNIFICANT CHANGE UP
EOSINOPHIL # BLD AUTO: 0.11 K/UL — SIGNIFICANT CHANGE UP (ref 0–0.5)
EOSINOPHIL NFR BLD AUTO: 1.2 % — SIGNIFICANT CHANGE UP (ref 0–6)
ERYTHROCYTE [SEDIMENTATION RATE] IN BLOOD: 4 MM/HR — SIGNIFICANT CHANGE UP (ref 1–15)
ESTIMATED AVERAGE GLUCOSE: 111 — SIGNIFICANT CHANGE UP
GLUCOSE BLDC GLUCOMTR-MCNC: 90 MG/DL — SIGNIFICANT CHANGE UP (ref 70–99)
GLUCOSE SERPL-MCNC: 95 MG/DL — SIGNIFICANT CHANGE UP (ref 70–99)
HCT VFR BLD CALC: 43.4 % — SIGNIFICANT CHANGE UP (ref 39–50)
HDLC SERPL-MCNC: 34 MG/DL — LOW
HGB BLD-MCNC: 14.7 G/DL — SIGNIFICANT CHANGE UP (ref 13–17)
IANC: 5.8 K/UL — SIGNIFICANT CHANGE UP (ref 1.8–7.4)
IMM GRANULOCYTES NFR BLD AUTO: 0.3 % — SIGNIFICANT CHANGE UP (ref 0–0.9)
LIPID PNL WITH DIRECT LDL SERPL: 78 MG/DL — SIGNIFICANT CHANGE UP
LYMPHOCYTES # BLD AUTO: 2.38 K/UL — SIGNIFICANT CHANGE UP (ref 1–3.3)
LYMPHOCYTES # BLD AUTO: 26.4 % — SIGNIFICANT CHANGE UP (ref 13–44)
MCHC RBC-ENTMCNC: 29.2 PG — SIGNIFICANT CHANGE UP (ref 27–34)
MCHC RBC-ENTMCNC: 33.9 GM/DL — SIGNIFICANT CHANGE UP (ref 32–36)
MCV RBC AUTO: 86.3 FL — SIGNIFICANT CHANGE UP (ref 80–100)
MONOCYTES # BLD AUTO: 0.66 K/UL — SIGNIFICANT CHANGE UP (ref 0–0.9)
MONOCYTES NFR BLD AUTO: 7.3 % — SIGNIFICANT CHANGE UP (ref 2–14)
NEUTROPHILS # BLD AUTO: 5.8 K/UL — SIGNIFICANT CHANGE UP (ref 1.8–7.4)
NEUTROPHILS NFR BLD AUTO: 64.4 % — SIGNIFICANT CHANGE UP (ref 43–77)
NON HDL CHOLESTEROL: 108 MG/DL — SIGNIFICANT CHANGE UP
NRBC # BLD: 0 /100 WBCS — SIGNIFICANT CHANGE UP (ref 0–0)
NRBC # FLD: 0 K/UL — SIGNIFICANT CHANGE UP (ref 0–0)
PLATELET # BLD AUTO: 326 K/UL — SIGNIFICANT CHANGE UP (ref 150–400)
POTASSIUM SERPL-MCNC: 3.9 MMOL/L — SIGNIFICANT CHANGE UP (ref 3.5–5.3)
POTASSIUM SERPL-SCNC: 3.9 MMOL/L — SIGNIFICANT CHANGE UP (ref 3.5–5.3)
RBC # BLD: 5.03 M/UL — SIGNIFICANT CHANGE UP (ref 4.2–5.8)
RBC # FLD: 12.9 % — SIGNIFICANT CHANGE UP (ref 10.3–14.5)
SODIUM SERPL-SCNC: 138 MMOL/L — SIGNIFICANT CHANGE UP (ref 135–145)
T4 FREE+ TSH PNL SERPL: 1.66 UIU/ML — SIGNIFICANT CHANGE UP (ref 0.27–4.2)
TRIGL SERPL-MCNC: 149 MG/DL — SIGNIFICANT CHANGE UP
TROPONIN T, HIGH SENSITIVITY RESULT: <6 NG/L — SIGNIFICANT CHANGE UP
WBC # BLD: 9.02 K/UL — SIGNIFICANT CHANGE UP (ref 3.8–10.5)
WBC # FLD AUTO: 9.02 K/UL — SIGNIFICANT CHANGE UP (ref 3.8–10.5)

## 2023-05-03 PROCEDURE — 93010 ELECTROCARDIOGRAM REPORT: CPT

## 2023-05-03 PROCEDURE — 75574 CT ANGIO HRT W/3D IMAGE: CPT | Mod: 26

## 2023-05-03 PROCEDURE — 93306 TTE W/DOPPLER COMPLETE: CPT | Mod: 26

## 2023-05-03 PROCEDURE — 99222 1ST HOSP IP/OBS MODERATE 55: CPT

## 2023-05-03 PROCEDURE — 99221 1ST HOSP IP/OBS SF/LOW 40: CPT | Mod: GC

## 2023-05-03 PROCEDURE — 99233 SBSQ HOSP IP/OBS HIGH 50: CPT

## 2023-05-03 RX ORDER — METOPROLOL TARTRATE 50 MG
2.5 TABLET ORAL ONCE
Refills: 0 | Status: DISCONTINUED | OUTPATIENT
Start: 2023-05-03 | End: 2023-05-03

## 2023-05-03 RX ORDER — METOPROLOL TARTRATE 50 MG
25 TABLET ORAL ONCE
Refills: 0 | Status: COMPLETED | OUTPATIENT
Start: 2023-05-03 | End: 2023-05-03

## 2023-05-03 RX ORDER — NICOTINE POLACRILEX 2 MG
1 GUM BUCCAL ONCE
Refills: 0 | Status: DISCONTINUED | OUTPATIENT
Start: 2023-05-03 | End: 2023-05-03

## 2023-05-03 RX ADMIN — SODIUM CHLORIDE 100 MILLILITER(S): 9 INJECTION, SOLUTION INTRAVENOUS at 00:38

## 2023-05-03 RX ADMIN — Medication 81 MILLIGRAM(S): at 12:13

## 2023-05-03 RX ADMIN — Medication 25 MILLIGRAM(S): at 11:55

## 2023-05-03 NOTE — DISCHARGE NOTE NURSING/CASE MANAGEMENT/SOCIAL WORK - PATIENT PORTAL LINK FT
You can access the FollowMyHealth Patient Portal offered by [facility] by registering at the following website: [email or web address]. By joining [employer]’s FollowMyHealth portal, you will also be able to view your health information using other applications (apps) compatible with our system.

## 2023-05-03 NOTE — SBIRT NOTE ADULT - NSSBIRTUNABLESCR_GEN_A_CORE
SW met patient at bedside for SBIRT assessment. Wife present as well and patient consent to discuss in her presence. Patient and wife both declined to participate in SBIRT. Patient stated they are Orthodox and only drinks once a week during [facility] and declined resources. Patient denied using any drugs./Patient refused

## 2023-05-03 NOTE — CONSULT NOTE ADULT - ATTENDING COMMENTS
The patient was seen and examined with the Cardiology Consultation Teaching Service on the morning of 5/3.   Please see the addendum to my note on that date.    Do not bill.    Josh Youssef MD  Cardiology  [phone]
Patient is a 42-year-old male with history of type 2 diabetes, on insulin therapy, on Tresiba 15 to 18 units at bedtime, no Premeal insulin.  Patient follows up with Dr Pineda and Dr. Smith outpatient.  Patient would like to continue his home Tresiba 15 units daily at bedtime.  This is okay with the endocrinology team once it is verified by the pharmacy.  Per his wife, patient may be discharged later today.  Therefore he may resume his home insulin regimen once he returns home.  For BP management continue to monitor, BP goal <130/80, follow-up with outpatient endocrinologist.  Regarding hyperlipidemia, continue with statin therapy.

## 2023-05-03 NOTE — PROGRESS NOTE ADULT - SUBJECTIVE AND OBJECTIVE BOX
ID: 41 yo M with history of DM, chronic smoker, who presents with chest pain that worsened over the past 2-3 days. Cardiology consulted for assistance to care    Patient seen and examined at bedside.    Overnight Events: Saw pt with wife at bedside. Pt reports he is still having 3-4/10 chest pain this morning that is improved from prior but not resolved. Has not had any SOB palpitations or diaphoresis. Pt still actively smoking 1/4 PPD, encouraged to stop. Multiple troponin checks negative and EKG is reassuring, plan on CTCA to risk stratification     Current Meds:  acetaminophen     Tablet .. 650 milliGRAM(s) Oral every 6 hours PRN  aluminum hydroxide/magnesium hydroxide/simethicone Suspension 30 milliLiter(s) Oral every 4 hours PRN  aspirin  chewable 81 milliGRAM(s) Oral daily  atorvastatin 40 milliGRAM(s) Oral at bedtime  dextrose 5%. 1000 milliLiter(s) IV Continuous <Continuous>  dextrose 5%. 1000 milliLiter(s) IV Continuous <Continuous>  dextrose 50% Injectable 25 Gram(s) IV Push once  dextrose 50% Injectable 12.5 Gram(s) IV Push once  dextrose 50% Injectable 25 Gram(s) IV Push once  dextrose Oral Gel 15 Gram(s) Oral once PRN  glucagon  Injectable 1 milliGRAM(s) IntraMuscular once  insulin lispro (ADMELOG) corrective regimen sliding scale   SubCutaneous three times a day before meals  insulin lispro (ADMELOG) corrective regimen sliding scale   SubCutaneous at bedtime  melatonin 3 milliGRAM(s) Oral at bedtime PRN  nicotine -   7 mG/24Hr(s) Patch 1 Patch Transdermal once  ondansetron Injectable 4 milliGRAM(s) IV Push every 8 hours PRN      Vitals:  T(F): 97.7 (-), Max: 98.4 (-)  HR: 67 (-) (67 - 92)  BP: 123/86 (-) (114/78 - 142/85)  RR: 18 (-03)  SpO2: 100% (-)  I&O's Summary    Physical Exam:  Appearance: No acute distress; well appearing  Eyes: PERRL, EOMI, pink conjunctiva  HEENT: Normal oral mucosa  Cardiovascular: RRR, S1, S2, no murmurs, rubs, or gallops; no edema; no JVD  Respiratory: Clear to auscultation bilaterally  Gastrointestinal: soft, non-tender, non-distended with normal bowel sounds  Musculoskeletal: No clubbing; no joint deformity   Neurologic: Non-focal  Lymphatic: No lymphadenopathy  Psychiatry: AAOx3, mood & affect appropriate  Skin: No rashes, ecchymoses, or cyanosis                          14.7   9.02  )-----------( 326      ( 03 May 2023 05:17 )             43.4     05-03    138  |  103  |  26<H>  ----------------------------<  95  3.9   |  21<L>  |  0.86    Ca    9.3      03 May 2023 05:17  Mg     2.30     05-02    TPro  7.8  /  Alb  5.1<H>  /  TBili  0.6  /  DBili  x   /  AST  13  /  ALT  14  /  AlkPhos  43  05-02    PT/INR - ( 02 May 2023 19:10 )   PT: 12.7 sec;   INR: 1.09 ratio         PTT - ( 02 May 2023 19:10 )  PTT:33.9 sec  CARDIAC MARKERS ( 03 May 2023 05:17 )  <6 ng/L / x     / x     / x     / x     / 2.6 ng/mL  CARDIAC MARKERS ( 02 May 2023 21:20 )  <6 ng/L / x     / x     / 91 U/L / x     / 2.2 ng/mL  CARDIAC MARKERS ( 02 May 2023 20:56 )  6 ng/L / x     / x     / x     / x     / x      CARDIAC MARKERS ( 02 May 2023 19:10 )  7 ng/L / x     / x     / 99 U/L / x     / 2.3 ng/mL    Total Cholesterol: 142  LDL: --  HDL: 34  T  ----------------------------------------------------------------------  DIMENSIONS:  Dimensions:     Normal Values:  LA:     2.9 cm    2.0 - 4.0 cm  Ao:     3.2 cm    2.0 - 3.8 cm  SEPTUM: 0.9 cm    0.6 - 1.2 cm  PWT:    0.8 cm    0.6 - 1.1 cm  LVIDd:  4.3 cm    3.0 - 5.6 cm  LVIDs:  2.8 cm    1.8 - 4.0 cm  Derived Variables:  LVMI: 68 g/m2  RWT: 0.37  Fractional short: 35 %  Ejection Fraction (Modified Stanley Rule): 64 %  ------------------------------------------------------------------------  OBSERVATIONS:  Mitral Valve: Normal mitral valve. Minimal mitral  regurgitation.  Aortic Root: Normal aortic root.  Aortic Valve: Normal trileaflet aortic valve.  Left Atrium: Normal left atrium.  Left Ventricle: Normal left ventricular systolic function.  No segmental wall motion abnormalities. Normal left  ventricular internal dimensions and wall thicknesses.  Normal left ventricular diastolic function.  Right Heart: Normal right atrium. Normal right ventricular  size and function. Normal tricuspid valve. Minimal  tricuspid regurgitation. Normal pulmonic valve.  Pericardium/PleuraNormal pericardium with no pericardial  effusion.  ------------------------------------------------------------------------  CONCLUSIONS:  1. Normal mitral valve. Minimal mitral regurgitation.  2. Normal left ventricular internal dimensions and wall  thicknesses.  3. Normal left ventricular systolic function. No segmental  wall motion abnormalities.  4. Normal left ventricular diastolic function.  5. Normal right ventricular size and function.  ------------------------------------------------------------------------  Confirmed on  5/3/2023 - 09:04:49 by Karlos Isaac M.D.,  [facility], [place]  -----------------------------------------------------------------------  A/P  41 yo M with history of DM, chronic smoker, who presents with chest pain that worsened over the past 2-3 days. Cardiology consulted for assistance to care    #Chest Pain  - Pt with DM and tobacco use p/w atypical CP  - Trop neg x4, CKMB neg  - TSH WNL  - S/p heparin gtt + brilinta   - EKG NSR WNL  - TTE with normal EF, no WMA  Plan:  - Low c/f ACS, can DC brilinta and heparin  - Recommend CTCA to help risk stratify CP   - Stop trending CE  - Maintain on tele     Jim Martinez PGY4  Cardiology Fellow    AMBROSIO Youssef 
Lionel Garrett  Hospitalist  Pager- [phone]    PROGRESS NOTE:     Patient is a 42y old  Male who presents with a chief complaint of Chest pain (03 May 2023 11:16)      SUBJECTIVE / OVERNIGHT EVENTS: Pt seen and examined by me   Chest pressure, 4-6/10, symptoms improved on  after receiving NTG but recurred this AM and is currently having chest pressure  Denies SOB or palpitations  Denies LE swelling     ADDITIONAL REVIEW OF SYSTEMS:    MEDICATIONS  (STANDING):  aspirin  chewable 81 milliGRAM(s) Oral daily  atorvastatin 40 milliGRAM(s) Oral at bedtime  dextrose 5%. 1000 milliLiter(s) (100 mL/Hr) IV Continuous <Continuous>  dextrose 5%. 1000 milliLiter(s) (50 mL/Hr) IV Continuous <Continuous>  dextrose 50% Injectable 25 Gram(s) IV Push once  dextrose 50% Injectable 12.5 Gram(s) IV Push once  dextrose 50% Injectable 25 Gram(s) IV Push once  glucagon  Injectable 1 milliGRAM(s) IntraMuscular once  insulin lispro (ADMELOG) corrective regimen sliding scale   SubCutaneous three times a day before meals  insulin lispro (ADMELOG) corrective regimen sliding scale   SubCutaneous at bedtime  metoprolol tartrate 25 milliGRAM(s) Oral once  nicotine -   7 mG/24Hr(s) Patch 1 Patch Transdermal once    MEDICATIONS  (PRN):  acetaminophen     Tablet .. 650 milliGRAM(s) Oral every 6 hours PRN Temp greater or equal to 38C (100.4F), Mild Pain (1 - 3)  aluminum hydroxide/magnesium hydroxide/simethicone Suspension 30 milliLiter(s) Oral every 4 hours PRN Dyspepsia  dextrose Oral Gel 15 Gram(s) Oral once PRN Blood Glucose LESS THAN 70 milliGRAM(s)/deciliter  melatonin 3 milliGRAM(s) Oral at bedtime PRN Insomnia  ondansetron Injectable 4 milliGRAM(s) IV Push every 8 hours PRN Nausea and/or Vomiting      CAPILLARY BLOOD GLUCOSE      POCT Blood Glucose.: 90 mg/dL (03 May 2023 10:55)  POCT Blood Glucose.: 128 mg/dL (02 May 2023 23:49)  POCT Blood Glucose.: 74 mg/dL (02 May 2023 20:15)  POCT Blood Glucose.: 82 mg/dL (02 May 2023 17:59)    I&O's Summary      PHYSICAL EXAM:  Vital Signs Last 24 Hrs  T(C): 36.5 (03 May 2023 05:00), Max: 36.9 (02 May 2023 23:30)  T(F): 97.7 (03 May 2023 05:00), Max: 98.4 (02 May 2023 23:30)  HR: 67 (03 May 2023 05:00) (67 - 92)  BP: 123/86 (03 May 2023 05:00) (114/78 - 142/85)  BP(mean): --  RR: 18 (03 May 2023 05:00) (18 - 18)  SpO2: 100% (03 May 2023 05:00) (100% - 100%)    Parameters below as of 02 May 2023 17:57  Patient On (Oxygen Delivery Method): room air        CONSTITUTIONAL: NAD, well-developed  RESPIRATORY: Normal respiratory effort; lungs are clear to auscultation bilaterally  CARDIOVASCULAR: Regular rate and rhythm, normal S1 and S2, no murmur/rub/gallop; No lower extremity edema; Peripheral pulses are 2+ bilaterally  ABDOMEN: Nontender to palpation, normoactive bowel sounds, no rebound/guarding; No hepatosplenomegaly  MUSCLOSKELETAL: no clubbing or cyanosis of digits; no joint swelling or tenderness to palpation  PSYCH: A+O to person, place, and time; affect appropriate  NEURO: No focal deficits  SKIN: No rash     LABS:                        14.7   9.02  )-----------( 326      ( 03 May 2023 05:17 )             43.4     05-03    138  |  103  |  26<H>  ----------------------------<  95  3.9   |  21<L>  |  0.86    Ca    9.3      03 May 2023 05:17  Mg     2.30     05-02    TPro  7.8  /  Alb  5.1<H>  /  TBili  0.6  /  DBili  x   /  AST  13  /  ALT  14  /  AlkPhos  43  05-02    PT/INR - ( 02 May 2023 19:10 )   PT: 12.7 sec;   INR: 1.09 ratio         PTT - ( 02 May 2023 19:10 )  PTT:33.9 sec  CARDIAC MARKERS ( 03 May 2023 05:17 )  x     / x     / x     / x     / 2.6 ng/mL  CARDIAC MARKERS ( 02 May 2023 21:20 )  x     / x     / 91 U/L / x     / 2.2 ng/mL  CARDIAC MARKERS ( 02 May 2023 19:10 )  x     / x     / 99 U/L / x     / 2.3 ng/mL      Urinalysis Basic - ( 02 May 2023 19:18 )    Color: Light Yellow / Appearance: Clear / S.011 / pH: x  Gluc: x / Ketone: Trace  / Bili: Negative / Urobili: <2 mg/dL   Blood: x / Protein: Negative / Nitrite: Negative   Leuk Esterase: Negative / RBC: x / WBC x   Sq Epi: x / Non Sq Epi: x / Bacteria: x          RADIOLOGY & ADDITIONAL TESTS:  Results Reviewed:   Imaging Personally Reviewed:  Electrocardiogram Personally Reviewed:    COORDINATION OF CARE:  Care Discussed with Consultants/Other Providers [Y/N]:  Prior or Outpatient Records Reviewed [Y/N]:

## 2023-05-03 NOTE — DISCHARGE NOTE PROVIDER - NSDCMRMEDTOKEN_GEN_ALL_CORE_FT
Tresiba FlexTouch 100 units/mL subcutaneous solution: 15 unit(s) subcutaneous once a day (at bedtime) 15-18 units nightly

## 2023-05-03 NOTE — DISCHARGE NOTE PROVIDER - NSDCCPCAREPLAN_GEN_ALL_CORE_FT
PRINCIPAL DISCHARGE DIAGNOSIS  Diagnosis: Chest pain  Assessment and Plan of Treatment: You were admitted for chest pain which could be from angina. Your cardiac markers were negative and your echo was within normal limits. You were given Ticagrelor 180mg x1,  and was started on Aspirin 81mg daily and atorvastatin 40mg nightly. you were seen by Cardiolgy and was advised a CT Coronaries        SECONDARY DISCHARGE DIAGNOSES  Diagnosis: Type 2 diabetes mellitus treated with insulin  Assessment and Plan of Treatment: You were seen by Endocrinology and was recommended  to continue your home regimen. Please adhere to low carb and low fat  diet. Please  call your  doctor with persistent high or low BG at home.  and follow with your endocrinologist Dr Pineda     PRINCIPAL DISCHARGE DIAGNOSIS  Diagnosis: Chest pain  Assessment and Plan of Treatment: You were admitted for chest pain which could be from angina. Your cardiac markers were negative and your echo was within normal limits. You were given Ticagrelor 180mg x1 you were seen by Cardiolgy and was advised a CT Coronaries which are negative. your chest pain is unlikely cardiac cause. Followup with your primary care provider in 1-2 weeks         SECONDARY DISCHARGE DIAGNOSES  Diagnosis: Type 2 diabetes mellitus treated with insulin  Assessment and Plan of Treatment: You were seen by Endocrinology and was recommended  to continue your home regimen. Please adhere to low carb and low fat  diet. Please  call your  doctor with persistent high or low BG at home.  and follow with your endocrinologist Dr Pineda

## 2023-05-03 NOTE — CONSULT NOTE ADULT - ASSESSMENT
Mr. Judge is a 42 year old male with a PMHx of T2DM, HLD, Tobacco use disorder who presents with chest pain. Endocrinology consulted for management of T2DM.    Poorly controlled T2DM with hyperglycemia  - HbA1c: 5.5  - Home Regimen:   - Endocrinologist:  PLAN  - Hold oral DM agents while inpatient  - Start Lantus  units at bedtime. DO NOT HOLD IF NPO.  - Start Admelog  units TID pre-meal. HOLD IF NPO.  - Use moderate/Use low dose Admelog correction scale pre-meal  - Use moderate/Use low dose Admelog correction scale at bedtime  - Fingerstick BG before meals and bedtime  - Goal -180  - Carbohydrate consistent diet  - RD consult  Discharge plan:  - Discharge medications: ************************  - Patient to call doctor with persistent high or low BG at home.   - Ensure patient has glucometer, test strips and lancets on discharge.  - Recommend routine outpatient ophthalmology, podiatry and endocrinology f/u    HTN  - Home regimen:  PLAN  - Can check urine microalbumin outpatient  - Outpatient goal BP <130/80. Management per primary team.    HLD  - Home regimen:  PLAN  - Continue  - Would likely benefit from a statin if no contraindication  - Can check lipid profile if not done recently    Discussed with primary team.    Carlos Haskins MD, Endocrinology Fellow  Pager [phone] from 9am to 5pm. After hours and on weekends, please call [phone].   Mr. Judge is a 42 year old male with a PMHx of T2DM, HLD, Tobacco use disorder who presents with chest pain. Endocrinology consulted for management of T2DM.    T2DM with hyperglycemia  - HbA1c: 5.5  - Home Regimen: tresiba 15 - 18 units  - Endocrinologist: Dr. Edwin TRIPP  - patient can take home tresiba 15 units daily at bedtime (please order as patients own medication, have it sent to pharmacy for verification and can be administered by RN)   - Use low dose Admelog correction scale pre-meal  - Use low dose Admelog correction scale at bedtime  - Fingerstick BG before meals and bedtime  - Goal -180  - Carbohydrate consistent diet  Discharge plan:  - Discharge medications: home tresiba  - Patient to call doctor with persistent high or low BG at home.   - Ensure patient has glucometer, test strips and lancets on discharge.  - Recommend routine outpatient ophthalmology, podiatry and endocrinology f/u    HTN  - BP inpatient <130/80 recently  PLAN  - Can check urine microalbumin outpatient  - Outpatient goal BP <130/80. Management per primary team.    HLD  - LDL 78  PLAN  - Continue atorvastatin 40mg daily per primary team    Discussed with primary team.    Carlos Haskins MD, Endocrinology Fellow  Pager [phone] from 9am to 5pm. After hours and on weekends, please call [phone].

## 2023-05-03 NOTE — DISCHARGE NOTE PROVIDER - DID THE PATIENT PRESENT WITH OR WAS TREATED FOR MALNUTRITION DURING THIS ADMISSION
[FreeTextEntry1] : Summary the patient is a 65-year-old obese male with hypertension, diabetes, recently diagnosed with obstructive sleep apnea, history of COVID-19 pneumonia who presents today for follow-up.  The patient was recently diagnosed with moderate obstructive sleep apnea and his physical exam is unchanged.  An order for an auto CPAP has been sent to his [employer] company.\par \par I have instructed the patient to use Tylenol for his low back pain and to follow-up in 3 months for compliance report
No

## 2023-05-03 NOTE — PROGRESS NOTE ADULT - PROBLEM SELECTOR PLAN 1
Concern for Unstable Angina  - EKG nonischemic and troponins 7-> 6 -> <6, CKMB mass assay negative x2  - no prior LHC or ECHO   - JOSE J score 0, HEART score 1, Sabrina score 40  -ECHO- Normal left ventricular systolic function. No segmental wall motion abnormalities.  - Monitor on telemetry  - s/p  Ticagrelor 180mg x1, heparin drip (pt declining heparin drip at this time)  - On Aspirin, Lipitor  - [facility] Attending Dr Youssef- plan for CT coronaries

## 2023-05-03 NOTE — DISCHARGE NOTE PROVIDER - HOSPITAL COURSE
42 yr old male with a pmh of T2DM on insulin and tobacco use who presents with an ~3 month history of intermittent left sided 0-6/10 chest discomfort (that he describes as a squeezing sensation). The discomfort has become constant over the past 2-3 days and today it radiated down his left arm causing a pins and needles sensation in his arm.   He went to urgent care and was told to go to the ED for further evaluation. Pt has had these symptoms in the past but never this severe/constant   Of note: ~3 months ago patient had symptoms of weight loss and "drenching of sweat at night" per wife. It was around this time that his T2DM was found to be uncontrolled and he was transitioned to insulin with good control. He has since regained some of this weight loss and no longer has the night sweats.   Pt also under increased amount of stress over the past 3-4 months with the arrival of their  child. Though he has adjusted to the , he still seems stressed to family and they are concerned and have sent him to see a therapist last week. No prior LHC or ECHO   Denies  dizziness,  palpitations, SOB, abdominal pain, joint pain, diarrhea/constipation, urinary symptoms.   Vitals: T 98, HR 86, /85, RR 18 satting 100% RA. ECG demonstrates sinus rhythm without definitive ST-segment deviation suggestive of ischemia or injury  Echocardiography demonstrates normal LV size and function, no significant valve dysfunction  Troponins 7-> 6 -> <6, CKMB mass assay negative x2. JOSE J score 0, HEART score 1, Sabrina score 40. ASCVD Risk Score is 7%,  s/p Ticagrelor 180mg x1, was advised heparin drip but pt declined heparin drip. Was started on Aspirin 81mg daily and atorvastatin 40mg nightly  Pt was seen by cardiology and as per recs, patient's clinical history could be consistent with angina, however, his ECG, cardiac biomarkers, and echocardiography are all very reassuring. The differential diagnosis for chest pain is extensive, but it is worth noting that he has no particular risk factors for pulmonary embolism or aortic dissection. His presentation would also be atypical for pericarditis and his inflammatory markers are normal. Pt a  CT angiography should be able to be performed on him for a definitive non-invasive assessment of his coronary arteries. I would defer treatment for acute coronary syndrome, although aspirin and statin are reasonable to use in a diabetic patient with these symptoms for the time being.     #Type 2 diabetes mellitus on long term Insulin. A1C- 5.5. Home regimen- Takes Tresiba 15-18 units nightly. Pt would like to bring in his own medication for basal insulin use  Was seen by Shala and was deemed ok to use home med Tresiba. Sees  Endocrinologist:      # Tobacco dependence- Smokes 3 cigarettes daily. Declined nicotine patch. Counseled  on smoking cessation       42 yr old male with a pmh of T2DM on insulin and tobacco use who presents with an ~3 month history of intermittent left sided 0-6/10 chest discomfort (that he describes as a squeezing sensation). The discomfort has become constant over the past 2-3 days and today it radiated down his left arm causing a pins and needles sensation in his arm.   He went to urgent care and was told to go to the ED for further evaluation. Pt has had these symptoms in the past but never this severe/constant   Of note: ~3 months ago patient had symptoms of weight loss and "drenching of sweat at night" per wife. It was around this time that his T2DM was found to be uncontrolled and he was transitioned to insulin with good control. He has since regained some of this weight loss and no longer has the night sweats.   Pt also under increased amount of stress over the past 3-4 months with the arrival of their  child. Though he has adjusted to the , he still seems stressed to family and they are concerned and have sent him to see a therapist last week. No prior LHC or ECHO   Denies  dizziness,  palpitations, SOB, abdominal pain, joint pain, diarrhea/constipation, urinary symptoms.   Vitals: T 98, HR 86, /85, RR 18 satting 100% RA. ECG demonstrates sinus rhythm without definitive ST-segment deviation suggestive of ischemia or injury  Echocardiography demonstrates normal LV size and function, no significant valve dysfunction  Troponins 7-> 6 -> <6, CKMB mass assay negative x2. JOSE J score 0, HEART score 1, Sabrina score 40. ASCVD Risk Score is 7%,  s/p Ticagrelor 180mg x1, was advised heparin drip but pt declined heparin drip. Was started on Aspirin 81mg daily and atorvastatin 40mg nightly  Pt was seen by cardiology and as per recs, patient's clinical history could be consistent with angina, however, his ECG, cardiac biomarkers, and echocardiography are all very reassuring. The differential diagnosis for chest pain is extensive, but it is worth noting that he has no particular risk factors for pulmonary embolism or aortic dissection. His presentation would also be atypical for pericarditis and his inflammatory markers are normal. CT coronary are normal. Chest pain unlikely cardiac source     #Type 2 diabetes mellitus on long term Insulin. A1C- 5.5. Home regimen- Takes Tresiba 15-18 units nightly. Pt would like to bring in his own medication for basal insulin use  Was seen by Shala and was deemed ok to use home med Tresiba. Sees  Endocrinologist:      # Tobacco dependence- Smokes 3 cigarettes daily. Declined nicotine patch. Counseled  on smoking cessation

## 2023-05-03 NOTE — CHART NOTE - NSCHARTNOTEFT_GEN_A_CORE
Notified by pharmacy pt cannot use home Treshiba pen given they cannot verified when/how the medication was store prior. Treshiba would need to be order out to arrive tomorrow (vial).   If pt can either take lantus tonight or skip a dose to have Treshiba tomorrow.   Discussed with pt he insisted on Treshiba and said will be caution what he eat tonight.    Attending informed

## 2023-05-03 NOTE — PROGRESS NOTE ADULT - ATTENDING COMMENTS
The patient was seen and examined with the Cardiology Consultation Teaching Service.   Patient's spouse at the bedside.    The patient is a 42-year-old male smoker with diabetes who presented with intermittent chest pain that has occurred over the last several months, and increased in severity and frequency over the last several days. Currently his chest pain is constant. No changes with exertion or position are noted. The patient was decreased after one sublingual nitroglycerin.     No dyspnea, orthopnea or PND  No palpitations or syncope    No limitations with regard to exertional tolerance.    PMH/PSH:  Diabetes on insulin  Smoker    Comfortable-appearing man in no acute distress  Alert and oriented  Afebrile  Vital signs stable  JVP is not elevated  Clear lungs  Normal heart sounds  Extremities are warm and perfused  No peripheral edema     Normal blood counts  Normal coagulation  Mild metabolic acidosis, AG 14    hs-troponin undetectable x4  CK-MB normal  pro-BNP 16    CRP <3  HbA1c 5.5  , , HDL 34, LDL 78, NHDL 108    ECG demonstrates sinus rhythm without definitive ST-segment deviation suggestive of ischemia or injury  Echocardiography demonstrates normal LV size and function, no significant valve dysfunction    Impression and Recommendations:   42-year-old male smoker with diabetes who presented with intermittent chest pain that has occurred over the last several months, and increased in severity and frequency over the last several days.    The patient's clinical history could be consistent with angina, however, his ECG, cardiac biomarkers, and echocardiography are all very reassuring. The differential diagnosis for chest pain is extensive, but it is worth noting that he has no particular risk factors for pulmonary embolism or aortic dissection. His presentation would also be atypical for pericarditis and his inflammatory markers are normal.      Although the patient has diabetes and is a smoker, he is relatively young, and coronary CT angiography should be able to be performed on him for a definitive non-invasive assessment of his coronary arteries. I would defer treatment for acute coronary syndrome, although aspirin and statin are reasonable to use in a diabetic patient with these symptoms for the time being.     ASCVD Risk Score is 7%, which falls in the borderline-intermediate range.     Josh Youssef MD  Cardiology  [phone]

## 2023-05-03 NOTE — CONSULT NOTE ADULT - SUBJECTIVE AND OBJECTIVE BOX
ENDOCRINE INITIAL CONSULT - diabetes    HPI:  42 yr old male with a pmh of T2DM on insulin and tobacco use who presents with an ~3 month history of intermittent left sided 0-6/10 chest discomfort (that he describes as a squeezing sensation). The discomfort has become constant over the past 2-3 days and today it radiated down his left arm causing a pins and needles sensation in his arm.   He went to urgent care and was told to go to the ED for further evaluation.   Pt has had these symptoms in the past but never this severe/constant   Of note: ~3 months ago patient had symptoms of weight loss and "drenching of sweat at night" per wife. It was around this time that his T2DM was found to be uncontrolled and he was transitioned to insulin with good control. He has since regained some of this weight loss and no longer has the night sweats.   Pt also under increased amount of stress over the past 3-4 months with the arrival of their  child. Though he has adjusted to the , he still seems stressed to family and they are concerned and have sent him to see a therapist last week.   Denies  dizziness,  palpitations, SOB, abdominal pain, joint pain, diarrhea/constipation, urinary symptoms.   Vitals: T 98, HR 86, /85, RR 18 satting 100% RA   (02 May 2023 23:33)      ENDOCRINE HISTORY   Diagnosed with DM:   Last HbA1c: 5.5  Endocrinologist:   Home DM Meds:  Adherence:  Microvascular complications: Renal, opthalmologic, neuropathy  Macrovascular complications:  SMBG:  Symptoms:  Hypoglycemia episodes:  BG at home:  Diet at home:  Appetite in hospital:  Exercise:  PMHx:  PSHx:  Family hx:  Social hx:  Insurance:  Resides in:      PAST MEDICAL & SURGICAL HISTORY:  T2DM (type 2 diabetes mellitus)      No significant past surgical history          FAMILY HISTORY:  FH: type 2 diabetes mellitus (Father, Mother)        Social History:  Lives with wife, 4 yr old daughter and 4 month old . (02 May 2023 23:33)      Home Medications:  Tresiba FlexTouch 100 units/mL subcutaneous solution: 15 unit(s) subcutaneous once a day (at bedtime) 15-18 units nightly (02 May 2023 23:33)      MEDICATIONS  (STANDING):  aspirin  chewable 81 milliGRAM(s) Oral daily  atorvastatin 40 milliGRAM(s) Oral at bedtime  dextrose 5%. 1000 milliLiter(s) (100 mL/Hr) IV Continuous <Continuous>  dextrose 5%. 1000 milliLiter(s) (50 mL/Hr) IV Continuous <Continuous>  dextrose 50% Injectable 25 Gram(s) IV Push once  dextrose 50% Injectable 12.5 Gram(s) IV Push once  dextrose 50% Injectable 25 Gram(s) IV Push once  glucagon  Injectable 1 milliGRAM(s) IntraMuscular once  insulin lispro (ADMELOG) corrective regimen sliding scale   SubCutaneous three times a day before meals  insulin lispro (ADMELOG) corrective regimen sliding scale   SubCutaneous at bedtime  nicotine -   7 mG/24Hr(s) Patch 1 Patch Transdermal once    MEDICATIONS  (PRN):  acetaminophen     Tablet .. 650 milliGRAM(s) Oral every 6 hours PRN Temp greater or equal to 38C (100.4F), Mild Pain (1 - 3)  aluminum hydroxide/magnesium hydroxide/simethicone Suspension 30 milliLiter(s) Oral every 4 hours PRN Dyspepsia  dextrose Oral Gel 15 Gram(s) Oral once PRN Blood Glucose LESS THAN 70 milliGRAM(s)/deciliter  melatonin 3 milliGRAM(s) Oral at bedtime PRN Insomnia  ondansetron Injectable 4 milliGRAM(s) IV Push every 8 hours PRN Nausea and/or Vomiting      Allergies    No Known Allergies    Intolerances        REVIEW OF SYSTEMS  Constitutional: No fever  Eyes: No blurry vision  Neuro: No tremors  HEENT: No pain  Cardiovascular: No chest pain, palpitations  Respiratory: No SOB, no cough  GI: No nausea, vomiting, abdominal pain  : No dysuria  Skin: no rash  Psych: no depression  Endocrine: no polyuria, polydipsia  Hem/lymph: no swelling  Osteoporosis: no fractures  ALL OTHER SYSTEMS REVIEWED AND NEGATIVE     UNABLE TO OBTAIN     PHYSICAL EXAM   Vital Signs Last 24 Hrs  T(C): 36.5 (03 May 2023 05:00), Max: 36.9 (02 May 2023 23:30)  T(F): 97.7 (03 May 2023 05:00), Max: 98.4 (02 May 2023 23:30)  HR: 67 (03 May 2023 05:00) (67 - 92)  BP: 123/86 (03 May 2023 05:00) (114/78 - 142/85)  BP(mean): --  RR: 18 (03 May 2023 05:00) (18 - 18)  SpO2: 100% (03 May 2023 05:00) (100% - 100%)    Parameters below as of 02 May 2023 17:57  Patient On (Oxygen Delivery Method): room air      GENERAL: NAD, well-groomed, well-developed  EYES: No proptosis, no lid lag, anicteric  HEENT:  Atraumatic, Normocephalic, moist mucous membranes  THYROID: Normal size, no palpable nodules  RESPIRATORY: Clear to auscultation bilaterally; No rales, rhonchi, wheezing  CARDIOVASCULAR: Regular rate and rhythm; No murmurs; no peripheral edema  GI: Soft, nontender, non distended, normal bowel sounds  SKIN: Dry, intact, No rashes or lesions  MUSCULOSKELETAL: Full range of motion, normal strength  NEURO: sensation intact, extraocular movements intact, no tremor  PSYCH: Alert and oriented x 3, normal affect, normal mood  CUSHING'S SIGNS: no striae    CAPILLARY BLOOD GLUCOSE      POCT Blood Glucose.: 128 mg/dL (02 May 2023 23:49)  POCT Blood Glucose.: 74 mg/dL (02 May 2023 20:15)  POCT Blood Glucose.: 82 mg/dL (02 May 2023 17:59)      A1C with Estimated Average Glucose Result: 5.5 % (23 @ 05:17)                            14.7   9.02  )-----------( 326      ( 03 May 2023 05:17 )             43.4       05-    138  |  103  |  26<H>  ----------------------------<  95  3.9   |  21<L>  |  0.86    Ca    9.3      03 May 2023 05:17  Mg     2.30     -    TPro  7.8  /  Alb  5.1<H>  /  TBili  0.6  /  DBili  x   /  AST  13  /  ALT  14  /  AlkPhos  43            LIPIDS    RADIOLOGY ENDOCRINE INITIAL CONSULT - diabetes    HPI:  42 yr old male with a pmh of T2DM on insulin and tobacco use who presents with an ~3 month history of intermittent left sided 0-6/10 chest discomfort (that he describes as a squeezing sensation). The discomfort has become constant over the past 2-3 days and today it radiated down his left arm causing a pins and needles sensation in his arm.   He went to urgent care and was told to go to the ED for further evaluation.   Pt has had these symptoms in the past but never this severe/constant   Of note: ~3 months ago patient had symptoms of weight loss and "drenching of sweat at night" per wife. It was around this time that his T2DM was found to be uncontrolled and he was transitioned to insulin with good control. He has since regained some of this weight loss and no longer has the night sweats.   Pt also under increased amount of stress over the past 3-4 months with the arrival of their  child. Though he has adjusted to the , he still seems stressed to family and they are concerned and have sent him to see a therapist last week.   Denies  dizziness,  palpitations, SOB, abdominal pain, joint pain, diarrhea/constipation, urinary symptoms.   Vitals: T 98, HR 86, /85, RR 18 satting 100% RA   (02 May 2023 23:33)      ENDOCRINE HISTORY   Diagnosed with DM: 2, 3 years ago  Last HbA1c: 5.5  Endocrinologist: Dr. Pineda  [place] DM Meds: tresiba 15 units, occasionally 18 units if he knows he will eat more  Adherence: endorses compliance  Microvascular complications: denies  Macrovascular complications: denies  SMB times daily, am 110-120, afternoon 78-80, denies <70  Symptoms: denies polyuria, polydipsia, neuropathy  Diet at home:  - Breakfast: bread, eggs, waffle  - skips lunch  - Dinner: rice, chicken, meat  - will occasionally have cookies for snacks  - has cut down a lot on soda, occasionally orange juice, has more coffee and tea  Exercise: denies  PMHx: as above  PSHx: as above  Family hx: mother and father with T2DM  Social hx: as below      PAST MEDICAL & SURGICAL HISTORY:  T2DM (type 2 diabetes mellitus)      No significant past surgical history          FAMILY HISTORY:  FH: type 2 diabetes mellitus (Father, Mother)        Social History:  Lives with wife, 4 yr old daughter and 4 month old . (02 May 2023 23:33)      Home Medications:  Tresiba FlexTouch 100 units/mL subcutaneous solution: 15 unit(s) subcutaneous once a day (at bedtime) 15-18 units nightly (02 May 2023 23:33)      MEDICATIONS  (STANDING):  aspirin  chewable 81 milliGRAM(s) Oral daily  atorvastatin 40 milliGRAM(s) Oral at bedtime  dextrose 5%. 1000 milliLiter(s) (100 mL/Hr) IV Continuous <Continuous>  dextrose 5%. 1000 milliLiter(s) (50 mL/Hr) IV Continuous <Continuous>  dextrose 50% Injectable 25 Gram(s) IV Push once  dextrose 50% Injectable 12.5 Gram(s) IV Push once  dextrose 50% Injectable 25 Gram(s) IV Push once  glucagon  Injectable 1 milliGRAM(s) IntraMuscular once  insulin lispro (ADMELOG) corrective regimen sliding scale   SubCutaneous three times a day before meals  insulin lispro (ADMELOG) corrective regimen sliding scale   SubCutaneous at bedtime  nicotine -   7 mG/24Hr(s) Patch 1 Patch Transdermal once    MEDICATIONS  (PRN):  acetaminophen     Tablet .. 650 milliGRAM(s) Oral every 6 hours PRN Temp greater or equal to 38C (100.4F), Mild Pain (1 - 3)  aluminum hydroxide/magnesium hydroxide/simethicone Suspension 30 milliLiter(s) Oral every 4 hours PRN Dyspepsia  dextrose Oral Gel 15 Gram(s) Oral once PRN Blood Glucose LESS THAN 70 milliGRAM(s)/deciliter  melatonin 3 milliGRAM(s) Oral at bedtime PRN Insomnia  ondansetron Injectable 4 milliGRAM(s) IV Push every 8 hours PRN Nausea and/or Vomiting      Allergies    No Known Allergies    Intolerances        REVIEW OF SYSTEMS  Constitutional: No fever  Eyes: No blurry vision  Neuro: No tremors, endorses headache  HEENT: No pain  Cardiovascular: endorses chest pain, denies palpitations  Respiratory: No SOB, no cough  GI: No nausea, vomiting, abdominal pain  : No dysuria  Skin: no rash  Psych: no depression  Endocrine: no polyuria, polydipsia  Hem/lymph: no swelling  Osteoporosis: no fractures  ALL OTHER SYSTEMS REVIEWED AND NEGATIVE       PHYSICAL EXAM   Vital Signs Last 24 Hrs  T(C): 36.5 (03 May 2023 05:00), Max: 36.9 (02 May 2023 23:30)  T(F): 97.7 (03 May 2023 05:00), Max: 98.4 (02 May 2023 23:30)  HR: 67 (03 May 2023 05:00) (67 - 92)  BP: 123/86 (03 May 2023 05:00) (114/78 - 142/85)  BP(mean): --  RR: 18 (03 May 2023 05:00) (18 - 18)  SpO2: 100% (03 May 2023 05:00) (100% - 100%)    Parameters below as of 02 May 2023 17:57  Patient On (Oxygen Delivery Method): room air      GENERAL: NAD, well-groomed, well-developed  EYES: No proptosis, no lid lag, anicteric  HEENT:  Atraumatic, Normocephalic, moist mucous membranes  THYROID: Normal size, no palpable nodules  RESPIRATORY: Clear to auscultation bilaterally; No rales, rhonchi, wheezing  CARDIOVASCULAR: Regular rate and rhythm; No murmurs; no peripheral edema  GI: Soft, nontender, non distended, normal bowel sounds  SKIN: Dry, intact, No rashes or lesions  MUSCULOSKELETAL: Full range of motion, moving extremities spontaneously  NEURO: sensation intact, extraocular movements intact, no tremor  PSYCH: Answering questions appropriately, normal affect, normal mood  CUSHING'S SIGNS: no striae, no acanthosis, no dorsocervical fat pad    CAPILLARY BLOOD GLUCOSE      POCT Blood Glucose.: 128 mg/dL (02 May 2023 23:49)  POCT Blood Glucose.: 74 mg/dL (02 May 2023 20:15)  POCT Blood Glucose.: 82 mg/dL (02 May 2023 17:59)      A1C with Estimated Average Glucose Result: 5.5 % (23 @ 05:17)                            14.7   9.02  )-----------( 326      ( 03 May 2023 05:17 )             43.4           138  |  103  |  26<H>  ----------------------------<  95  3.9   |  21<L>  |  0.86    Ca    9.3      03 May 2023 05:17  Mg     2.30     05-02    TPro  7.8  /  Alb  5.1<H>  /  TBili  0.6  /  DBili  x   /  AST  13  /  ALT  14  /  AlkPhos  43  05-02          LIPIDS    RADIOLOGY

## 2023-05-03 NOTE — PATIENT PROFILE ADULT - FALL HARM RISK - HARM RISK INTERVENTIONS
Yes Communicate Risk of Fall with Harm to all staff/Reinforce activity limits and safety measures with patient and family/Tailored Fall Risk Interventions/Visual Cue: Yellow wristband and red socks/Bed in lowest position, wheels locked, appropriate side rails in place/Call bell, personal items and telephone in reach/Instruct patient to call for assistance before getting out of bed or chair/Non-slip footwear when patient is out of bed/[place] to call system/Physically safe environment - no spills, clutter or unnecessary equipment/Purposeful Proactive Rounding/Room/bathroom lighting operational, light cord in reach

## 2023-05-03 NOTE — PROGRESS NOTE ADULT - PROBLEM SELECTOR PLAN 2
A1C of 5.5  BG 72  Home regimen: Tresiba 15-18 units nightly  Pt would like to bring in his own medication for basal insulin use  Agreeable to [facility]   Diabetic diet  CARLO Last recs

## 2023-05-03 NOTE — CHART NOTE - NSCHARTNOTEFT_GEN_A_CORE
FINDINGS:    CALCIUM SCORE: The calculated Agatston score is 0.    Agatston Score:  Left main: 0.  Left anterior descendin.  Left circumflex: 0.  Right coronary: 0.  CTCA WNL (CA score 0)  TTE WNL (normal EF, no WMA, no pathology)  No further cardiac work up needed at this time, reassurance given to pt and family at bedside.  Recommend f/u with PMD regarding non cardiac causes of CP        CTCA  0: No plaque is present.  1-10: A minimal amount of plaque is present.  : A mild amount of plaque is present.  101-400: A moderate amount of plaque is present.  Greater than 400: A large amount of plaque is present.      CORONARY: Right coronary artery dominance. No evidence for anomalous   coronary arteries.    LEFT MAIN: No plaques or stenosis. Bifurcates into LAD and circumflex.    LEFT ANTERIOR DESCENDING AND DIAGONALS:  No plaques or stenosis. 2 patent   diagonal branch is noted. A small portion of the vessel is not clearly   evaluated due to respiratory motion.    LEFT CIRCUMFLEX AND OBTUSE MARGINALS: No plaques or stenosis. 3 patent   obtuse marginal branches noted. A small portion of the vessel is not   clearly evaluated due to respiratory motion.    RIGHT CORONARY ARTERY: Gives off PDA, PLV branches. . No plaques or   stenosis. A small portion of the vessel is not clearly evaluated due to   respiratory motion.    MORPHOLOGY: Normal LV size and shape.  Normal end diastolic left   ventricular wall thickness.    VALVES: The aortic valve is trileaflet. The anterior and posterior   leaflets of the mitral valve are normal..    PERICARDIUM: Normal pericardial contour. No pericardial effusion.    NONCARDIAC FINDINGS: No potentially significant noncardiac findings..      IMPRESSION:    Essentially normal coronaries. A small portion of the coronary tree is   not evaluable due to respiratory motion. All remaining segments are   normal.    --- End of Report ---    ------------------------------------------------------------------------  DIMENSIONS:  Dimensions:     Normal Values:  LA:     2.9 cm    2.0 - 4.0 cm  Ao:     3.2 cm    2.0 - 3.8 cm  SEPTUM: 0.9 cm    0.6 - 1.2 cm  PWT:    0.8 cm    0.6 - 1.1 cm  LVIDd:  4.3 cm    3.0 - 5.6 cm  LVIDs:  2.8 cm    1.8 - 4.0 cm  Derived Variables:  LVMI: 68 g/m2  RWT: 0.37  Fractional short: 35 %  Ejection Fraction (Modified Stanley Rule): 64 %  ------------------------------------------------------------------------  OBSERVATIONS:  Mitral Valve: Normal mitral valve. Minimal mitral  regurgitation.  Aortic Root: Normal aortic root.  Aortic Valve: Normal trileaflet aortic valve.  Left Atrium: Normal left atrium.  Left Ventricle:Normal left ventricular systolic function.  No segmental wall motion abnormalities. Normal left  ventricular internal dimensions and wall thicknesses.  Normal left ventricular diastolic function.  Right Heart: Normal right atrium. Normal right ventricular  size and function. Normal tricuspid valve. Minimal  tricuspid regurgitation. Normal pulmonic valve.  Pericardium/PleuraNormal pericardium with no pericardial  effusion.  ------------------------------------------------------------------------  CONCLUSIONS:  1. Normal mitral valve. Minimal mitral regurgitation.  2. Normal left ventricular internal dimensions and wall  thicknesses.  3. Normal left ventricular systolic function. No segmental  wall motion abnormalities.  4. Normal left ventricular diastolic function.  5. Normal right ventricular size and function.  ------------------------------------------------------------------------  Confirmed on  5/3/2023 - 09:04:49 by Karlos Isaac M.D.,  [facility], [place]  ------------------------------------------------------------------------ CTCA WNL (CA score 0)  TTE WNL (normal EF, no WMA, no pathology)  No further cardiac work up needed at this time, reassurance given to pt and family at bedside.  Recommend f/u with PMD regarding non cardiac causes of CP    FINDINGS:    CALCIUM SCORE: The calculated Agatston score is 0.    Agatston Score:  Left main: 0.  Left anterior descendin.  Left circumflex: 0.  Right coronary: 0.    CTCA  0: No plaque is present.  1-10: A minimal amount of plaque is present.  : A mild amount of plaque is present.  101-400: A moderate amount of plaque is present.  Greater than 400: A large amount of plaque is present.      CORONARY: Right coronary artery dominance. No evidence for anomalous   coronary arteries.    LEFT MAIN: No plaques or stenosis. Bifurcates into LAD and circumflex.    LEFT ANTERIOR DESCENDING AND DIAGONALS:  No plaques or stenosis. 2 patent   diagonal branch is noted. A small portion of the vessel is not clearly   evaluated due to respiratory motion.    LEFT CIRCUMFLEX AND OBTUSE MARGINALS: No plaques or stenosis. 3 patent   obtuse marginal branches noted. A small portion of the vessel is not   clearly evaluated due to respiratory motion.    RIGHT CORONARY ARTERY: Gives off PDA, PLV branches. . No plaques or   stenosis. A small portion of the vessel is not clearly evaluated due to   respiratory motion.    MORPHOLOGY: Normal LV size and shape.  Normal end diastolic left   ventricular wall thickness.    VALVES: The aortic valve is trileaflet. The anterior and posterior   leaflets of the mitral valve are normal..    PERICARDIUM: Normal pericardial contour. No pericardial effusion.    NONCARDIAC FINDINGS: No potentially significant noncardiac findings..      IMPRESSION:    Essentially normal coronaries. A small portion of the coronary tree is   not evaluable due to respiratory motion. All remaining segments are   normal.    --- End of Report ---    ------------------------------------------------------------------------  DIMENSIONS:  Dimensions:     Normal Values:  LA:     2.9 cm    2.0 - 4.0 cm  Ao:     3.2 cm    2.0 - 3.8 cm  SEPTUM: 0.9 cm    0.6 - 1.2 cm  PWT:    0.8 cm    0.6 - 1.1 cm  LVIDd:  4.3 cm    3.0 - 5.6 cm  LVIDs:  2.8 cm    1.8 - 4.0 cm  Derived Variables:  LVMI: 68 g/m2  RWT: 0.37  Fractional short: 35 %  Ejection Fraction (Modified Stanley Rule): 64 %  ------------------------------------------------------------------------  OBSERVATIONS:  Mitral Valve: Normal mitral valve. Minimal mitral  regurgitation.  Aortic Root: Normal aortic root.  Aortic Valve: Normal trileaflet aortic valve.  Left Atrium: Normal left atrium.  Left Ventricle:Normal left ventricular systolic function.  No segmental wall motion abnormalities. Normal left  ventricular internal dimensions and wall thicknesses.  Normal left ventricular diastolic function.  Right Heart: Normal right atrium. Normal right ventricular  size and function. Normal tricuspid valve. Minimal  tricuspid regurgitation. Normal pulmonic valve.  Pericardium/PleuraNormal pericardium with no pericardial  effusion.  ------------------------------------------------------------------------  CONCLUSIONS:  1. Normal mitral valve. Minimal mitral regurgitation.  2. Normal left ventricular internal dimensions and wall  thicknesses.  3. Normal left ventricular systolic function. No segmental  wall motion abnormalities.  4. Normal left ventricular diastolic function.  5. Normal right ventricular size and function.  ------------------------------------------------------------------------  Confirmed on  5/3/2023 - 09:04:49 by Karlos Isaac M.D.,  [facility], [place]  ------------------------------------------------------------------------

## 2023-05-04 ENCOUNTER — TRANSCRIPTION ENCOUNTER (OUTPATIENT)
Age: 43
End: 2023-05-04

## 2023-05-04 ENCOUNTER — NON-APPOINTMENT (OUTPATIENT)
Age: 43
End: 2023-05-04

## 2023-05-05 PROBLEM — E11.9 TYPE 2 DIABETES MELLITUS WITHOUT COMPLICATIONS: Chronic | Status: ACTIVE | Noted: 2023-05-02

## 2023-05-08 ENCOUNTER — APPOINTMENT (OUTPATIENT)
Dept: CARDIOLOGY | Facility: CLINIC | Age: 43
End: 2023-05-08
Payer: COMMERCIAL

## 2023-05-08 ENCOUNTER — NON-APPOINTMENT (OUTPATIENT)
Age: 43
End: 2023-05-08

## 2023-05-08 VITALS
OXYGEN SATURATION: 97 % | BODY MASS INDEX: 23.87 KG/M2 | SYSTOLIC BLOOD PRESSURE: 106 MMHG | WEIGHT: 157 LBS | DIASTOLIC BLOOD PRESSURE: 70 MMHG | HEART RATE: 84 BPM

## 2023-05-08 DIAGNOSIS — R07.9 CHEST PAIN, UNSPECIFIED: ICD-10-CM

## 2023-05-08 PROCEDURE — 93000 ELECTROCARDIOGRAM COMPLETE: CPT

## 2023-05-08 PROCEDURE — 99215 OFFICE O/P EST HI 40 MIN: CPT

## 2023-05-08 NOTE — HISTORY OF PRESENT ILLNESS
[FreeTextEntry1] : 42-year-old   (his wife is a headnurse in the [facility] respiratory care unit) presents for evaluation of chest pain.\par \par For the past 2 or 3 months has been experiencing prolonged, random episodes of left chest pressure unrelated to activity, meals position or respiration.  No history of trauma.  6 days ago, as had been typically occurring, he awoke at 7 AM with left chest pressure which was newly radiating to his left arm and was unremitting for many hours.  He presented to urgent care where there was a concern about ST elevation (in fact an early repolarization pattern) and was then referred  to [facility] where troponins were less than 6 twice and coronary CT angiography was entirely within normal normal limits with a calcium score of 0 and no evidence of plaque.  It is his sense that symptoms are secondary to anxiety which he attributes in part to the stresses of having a  child at home.  Generally active during this period including climbing multiple flights of stairs daily without any effort provokedsymptoms.\par \par His background is remarkable for insulin-dependent diabetes.  There is no history of hypertension, thyroid disorder or hyperlipidemia.  In the ED cholesterol was 142 with an HDL of 34, LDL of 78\par \par There is no history of rheumatic fever, murmur, thyroid disorder or hypertension.\par \par Smokes 3 to 4 cigars daily for many years.  Alcohol on weekends only.\par \par Family history is noncontributory.\par \par

## 2023-06-03 ENCOUNTER — NON-APPOINTMENT (OUTPATIENT)
Age: 43
End: 2023-06-03

## 2023-06-22 NOTE — DISCHARGE NOTE PROVIDER - NSDCQMPCI_CARD_ALL_CORE
Pt here today for 3/6 b12  injection; given in left deltoid; pt tolerated well. No s/s of allergic reaction noted at this time.   
No

## 2024-02-15 ENCOUNTER — APPOINTMENT (OUTPATIENT)
Dept: DERMATOLOGY | Facility: CLINIC | Age: 44
End: 2024-02-15
Payer: COMMERCIAL

## 2024-02-15 DIAGNOSIS — R21 RASH AND OTHER NONSPECIFIC SKIN ERUPTION: ICD-10-CM

## 2024-02-15 PROCEDURE — 99203 OFFICE O/P NEW LOW 30 MIN: CPT

## 2024-02-15 NOTE — HISTORY OF PRESENT ILLNESS
[FreeTextEntry1] : dry patches on palms [de-identified] : 44 yo M here for dry patch on palm  present for many years was given tac ointment in past which improved appearance but never fully went away

## 2024-02-15 NOTE — ASSESSMENT
[FreeTextEntry1] : Scaly plaque, right palm - chronic,flaring, uncertain clinical course - differential of eczema with LSC vs psoriasis  - trial of potent topical steroids first.  -  clobetasol ointment 1-2x/day. trial of month - - pt to let us know if not improving with treatment

## 2024-02-21 ENCOUNTER — TRANSCRIPTION ENCOUNTER (OUTPATIENT)
Age: 44
End: 2024-02-21

## 2024-02-21 RX ORDER — CLOBETASOL PROPIONATE 0.5 MG/G
0.05 OINTMENT TOPICAL
Qty: 1 | Refills: 2 | Status: ACTIVE | COMMUNITY
Start: 2024-02-21 | End: 1900-01-01

## 2024-08-28 ENCOUNTER — APPOINTMENT (OUTPATIENT)
Dept: HUMAN REPRODUCTION | Facility: CLINIC | Age: 44
End: 2024-08-28

## 2024-08-30 ENCOUNTER — APPOINTMENT (OUTPATIENT)
Dept: HUMAN REPRODUCTION | Facility: CLINIC | Age: 44
End: 2024-08-30
Payer: COMMERCIAL

## 2024-08-30 PROCEDURE — 36415 COLL VENOUS BLD VENIPUNCTURE: CPT
